# Patient Record
Sex: FEMALE | Race: WHITE | Employment: OTHER | URBAN - METROPOLITAN AREA
[De-identification: names, ages, dates, MRNs, and addresses within clinical notes are randomized per-mention and may not be internally consistent; named-entity substitution may affect disease eponyms.]

---

## 2017-09-12 ENCOUNTER — HOSPITAL ENCOUNTER (OUTPATIENT)
Dept: GENERAL RADIOLOGY | Age: 72
Discharge: HOME | End: 2017-09-12
Attending: INTERNAL MEDICINE | Admitting: INTERNAL MEDICINE

## 2017-10-03 ENCOUNTER — HOSPITAL ENCOUNTER (OUTPATIENT)
Dept: GENERAL RADIOLOGY | Age: 72
Discharge: HOME | End: 2017-10-03
Attending: INTERNAL MEDICINE | Admitting: INTERNAL MEDICINE

## 2017-10-03 LAB
ALBUMIN SERPL-MCNC: 3.8 GM/DL (ref 3.4–5)
ALP BLD-CCNC: 105 IU/L (ref 40–128)
ALT SERPL-CCNC: 16 U/L (ref 10–40)
ANION GAP SERPL CALCULATED.3IONS-SCNC: 14 MMOL/L (ref 4–16)
AST SERPL-CCNC: 18 IU/L (ref 15–37)
BANDED NEUTROPHILS ABSOLUTE COUNT: 0.06 K/CU MM
BANDED NEUTROPHILS RELATIVE PERCENT: 2 % (ref 5–11)
BASOPHILS ABSOLUTE: 0 K/CU MM
BASOPHILS RELATIVE PERCENT: 1 % (ref 0–1)
BILIRUB SERPL-MCNC: 0.2 MG/DL (ref 0–1)
BUN BLDV-MCNC: 16 MG/DL (ref 6–23)
CALCIUM SERPL-MCNC: 9.1 MG/DL (ref 8.3–10.6)
CHLORIDE BLD-SCNC: 102 MMOL/L (ref 99–110)
CO2: 25 MMOL/L (ref 21–32)
CREAT SERPL-MCNC: 0.7 MG/DL (ref 0.6–1.1)
DIFFERENTIAL TYPE: ABNORMAL
GFR AFRICAN AMERICAN: >60 ML/MIN/1.73M2
GFR NON-AFRICAN AMERICAN: >60 ML/MIN/1.73M2
GLUCOSE FASTING: 97 MG/DL (ref 70–99)
HCT VFR BLD CALC: 33.1 % (ref 37–47)
HEMOGLOBIN: 11.2 GM/DL (ref 12.5–16)
LYMPHOCYTES ABSOLUTE: 1.5 K/CU MM
LYMPHOCYTES RELATIVE PERCENT: 53 % (ref 24–44)
MACROCYTES: ABNORMAL
MCH RBC QN AUTO: 34.4 PG (ref 27–31)
MCHC RBC AUTO-ENTMCNC: 33.8 % (ref 32–36)
MCV RBC AUTO: 101.5 FL (ref 78–100)
MONOCYTES ABSOLUTE: 0.3 K/CU MM
MONOCYTES RELATIVE PERCENT: 9 % (ref 0–4)
PDW BLD-RTO: 13.2 % (ref 11.7–14.9)
PLATELET # BLD: 197 K/CU MM (ref 140–440)
PMV BLD AUTO: 8.4 FL (ref 7.5–11.1)
POTASSIUM SERPL-SCNC: 5 MMOL/L (ref 3.5–5.1)
RBC # BLD: 3.26 M/CU MM (ref 4.2–5.4)
SEGMENTED NEUTROPHILS ABSOLUTE COUNT: 1 K/CU MM
SEGMENTED NEUTROPHILS RELATIVE PERCENT: 35 % (ref 36–66)
SODIUM BLD-SCNC: 141 MMOL/L (ref 135–145)
TOTAL PROTEIN: 6 GM/DL (ref 6.4–8.2)
WBC # BLD: 2.9 K/CU MM (ref 4–10.5)
WBC # BLD: ABNORMAL 10*3/UL

## 2017-11-14 ENCOUNTER — HOSPITAL ENCOUNTER (OUTPATIENT)
Dept: GENERAL RADIOLOGY | Age: 72
Discharge: HOME | End: 2017-11-14
Attending: INTERNAL MEDICINE | Admitting: INTERNAL MEDICINE

## 2017-11-14 LAB
ANISOCYTOSIS: ABNORMAL
ATYPICAL LYMPHOCYTE ABSOLUTE COUNT: ABNORMAL
BANDED NEUTROPHILS ABSOLUTE COUNT: 0.07 K/CU MM
BANDED NEUTROPHILS RELATIVE PERCENT: 3 % (ref 5–11)
DIFFERENTIAL TYPE: ABNORMAL
HCT VFR BLD CALC: 25.3 % (ref 37–47)
HEMOGLOBIN: 8.8 GM/DL (ref 12.5–16)
LYMPHOCYTES ABSOLUTE: 1.6 K/CU MM
LYMPHOCYTES RELATIVE PERCENT: 67 % (ref 24–44)
MACROCYTES: ABNORMAL
MCH RBC QN AUTO: 37 PG (ref 27–31)
MCHC RBC AUTO-ENTMCNC: 34.8 % (ref 32–36)
MCV RBC AUTO: 106.3 FL (ref 78–100)
MONOCYTES ABSOLUTE: 0.1 K/CU MM
MONOCYTES RELATIVE PERCENT: 4 % (ref 0–4)
PDW BLD-RTO: 17 % (ref 11.7–14.9)
PLATELET # BLD: 4 K/CU MM (ref 140–440)
PLT MORPHOLOGY: ABNORMAL
RBC # BLD: 2.38 M/CU MM (ref 4.2–5.4)
SEGMENTED NEUTROPHILS ABSOLUTE COUNT: 0.6 K/CU MM
SEGMENTED NEUTROPHILS RELATIVE PERCENT: 26 % (ref 36–66)
WBC # BLD: 2.4 K/CU MM (ref 4–10.5)

## 2018-02-01 ENCOUNTER — HOSPITAL ENCOUNTER (OUTPATIENT)
Dept: OTHER | Age: 73
Discharge: HOME | End: 2018-02-01
Attending: INTERNAL MEDICINE | Admitting: INTERNAL MEDICINE

## 2018-02-23 LAB
ALBUMIN SERPL-MCNC: 2.8 GM/DL (ref 3.4–5)
ALP BLD-CCNC: 80 IU/L (ref 40–129)
ALT SERPL-CCNC: 9 U/L (ref 10–40)
ANION GAP SERPL CALCULATED.3IONS-SCNC: 11 MMOL/L (ref 4–16)
AST SERPL-CCNC: 12 IU/L (ref 15–37)
BILIRUB SERPL-MCNC: 0.2 MG/DL (ref 0–1)
BUN BLDV-MCNC: 13 MG/DL (ref 6–23)
CALCIUM SERPL-MCNC: 8.5 MG/DL (ref 8.3–10.6)
CHLORIDE BLD-SCNC: 107 MMOL/L (ref 99–110)
CO2: 25 MMOL/L (ref 21–32)
CREAT SERPL-MCNC: 0.7 MG/DL (ref 0.6–1.1)
GFR AFRICAN AMERICAN: >60 ML/MIN/1.73M2
GFR NON-AFRICAN AMERICAN: >60 ML/MIN/1.73M2
GLUCOSE BLD-MCNC: 96 MG/DL (ref 70–99)
HCT VFR BLD CALC: 27.6 % (ref 37–47)
HEMOGLOBIN: 8.4 GM/DL (ref 12.5–16)
MCH RBC QN AUTO: 30.1 PG (ref 27–31)
MCHC RBC AUTO-ENTMCNC: 30.4 % (ref 32–36)
MCV RBC AUTO: 98.9 FL (ref 78–100)
PDW BLD-RTO: 17.5 % (ref 11.7–14.9)
PLATELET # BLD: 265 K/CU MM (ref 140–440)
PMV BLD AUTO: 9.5 FL (ref 7.5–11.1)
POTASSIUM SERPL-SCNC: 4.1 MMOL/L (ref 3.5–5.1)
RBC # BLD: 2.79 M/CU MM (ref 4.2–5.4)
SODIUM BLD-SCNC: 143 MMOL/L (ref 135–145)
TOTAL PROTEIN: 4.8 GM/DL (ref 6.4–8.2)
WBC # BLD: 5.4 K/CU MM (ref 4–10.5)

## 2018-03-01 ENCOUNTER — HOSPITAL ENCOUNTER (OUTPATIENT)
Dept: OTHER | Age: 73
Discharge: HOME | End: 2018-03-01
Attending: INTERNAL MEDICINE | Admitting: INTERNAL MEDICINE

## 2018-03-05 LAB
HCT VFR BLD CALC: 36 % (ref 37–47)
HEMOGLOBIN: 10.8 GM/DL (ref 12.5–16)
MCH RBC QN AUTO: 31.2 PG (ref 27–31)
MCHC RBC AUTO-ENTMCNC: 30 % (ref 32–36)
MCV RBC AUTO: 104 FL (ref 78–100)
PDW BLD-RTO: 18 % (ref 11.7–14.9)
PLATELET # BLD: 302 K/CU MM (ref 140–440)
PMV BLD AUTO: 9 FL (ref 7.5–11.1)
RBC # BLD: 3.46 M/CU MM (ref 4.2–5.4)
WBC # BLD: 5.4 K/CU MM (ref 4–10.5)

## 2018-03-14 LAB
BACTERIA: ABNORMAL /HPF
BILIRUBIN URINE: NEGATIVE MG/DL
BLOOD, URINE: ABNORMAL
CLARITY: ABNORMAL
COLOR: YELLOW
GLUCOSE, URINE: NEGATIVE MG/DL
HCT VFR BLD CALC: 29.9 % (ref 37–47)
HEMOGLOBIN: 9.7 GM/DL (ref 12.5–16)
KETONES, URINE: NEGATIVE MG/DL
LEUKOCYTE ESTERASE, URINE: ABNORMAL
MCH RBC QN AUTO: 31.5 PG (ref 27–31)
MCHC RBC AUTO-ENTMCNC: 32.4 % (ref 32–36)
MCV RBC AUTO: 97.1 FL (ref 78–100)
MUCUS: ABNORMAL HPF
NITRITE URINE, QUANTITATIVE: POSITIVE
PDW BLD-RTO: 17.3 % (ref 11.7–14.9)
PH, URINE: 6 (ref 5–8)
PLATELET # BLD: 285 K/CU MM (ref 140–440)
PMV BLD AUTO: 8.6 FL (ref 7.5–11.1)
PROTEIN UA: 100 MG/DL
RBC # BLD: 3.08 M/CU MM (ref 4.2–5.4)
RBC URINE: 12 /HPF (ref 0–6)
SPECIFIC GRAVITY UA: 1.01 (ref 1–1.03)
TRICHOMONAS: ABNORMAL /HPF
UROBILINOGEN, URINE: NORMAL MG/DL (ref 0.2–1)
WBC # BLD: 11 K/CU MM (ref 4–10.5)
WBC CLUMP: ABNORMAL /HPF
WBC UA: 123 /HPF (ref 0–5)

## 2018-03-16 LAB
CULTURE: NORMAL
ORGANISM: NORMAL
REPORT STATUS: NORMAL
REQUEST PROBLEM: NORMAL
SPECIMEN: NORMAL
TOTAL COLONY COUNT: NORMAL

## 2018-10-26 ENCOUNTER — HOSPITAL ENCOUNTER (OUTPATIENT)
Dept: GENERAL RADIOLOGY | Age: 73
Discharge: HOME | End: 2018-10-26
Payer: MEDICARE

## 2018-10-26 ENCOUNTER — HOSPITAL ENCOUNTER (OUTPATIENT)
Age: 73
Discharge: HOME | End: 2018-10-26
Payer: MEDICARE

## 2018-10-26 DIAGNOSIS — J44.0 COPD WITH ACUTE LOWER RESPIRATORY INFECTION (HCC): ICD-10-CM

## 2018-10-26 PROCEDURE — 71046 X-RAY EXAM CHEST 2 VIEWS: CPT

## 2019-05-31 ENCOUNTER — HOSPITAL ENCOUNTER (OUTPATIENT)
Dept: GENERAL RADIOLOGY | Age: 74
Discharge: HOME | End: 2019-05-31
Payer: COMMERCIAL

## 2019-05-31 ENCOUNTER — HOSPITAL ENCOUNTER (OUTPATIENT)
Age: 74
Discharge: HOME | End: 2019-05-31
Payer: COMMERCIAL

## 2019-05-31 DIAGNOSIS — J18.9 ATYPICAL PNEUMONIA: ICD-10-CM

## 2019-05-31 PROCEDURE — 71046 X-RAY EXAM CHEST 2 VIEWS: CPT

## 2020-05-04 ENCOUNTER — HOSPITAL ENCOUNTER (EMERGENCY)
Age: 75
Discharge: HOME | End: 2020-05-04
Payer: MEDICARE

## 2020-05-04 ENCOUNTER — APPOINTMENT (OUTPATIENT)
Dept: GENERAL RADIOLOGY | Age: 75
End: 2020-05-04
Payer: MEDICARE

## 2020-05-04 ENCOUNTER — APPOINTMENT (OUTPATIENT)
Dept: CT IMAGING | Age: 75
End: 2020-05-04
Payer: MEDICARE

## 2020-05-04 VITALS
HEIGHT: 60 IN | DIASTOLIC BLOOD PRESSURE: 50 MMHG | RESPIRATION RATE: 15 BRPM | SYSTOLIC BLOOD PRESSURE: 117 MMHG | TEMPERATURE: 98.4 F | WEIGHT: 137 LBS | HEART RATE: 68 BPM | OXYGEN SATURATION: 97 % | BODY MASS INDEX: 26.9 KG/M2

## 2020-05-04 LAB
ALBUMIN SERPL-MCNC: 4.1 GM/DL (ref 3.4–5)
ALP BLD-CCNC: 97 IU/L (ref 40–129)
ALT SERPL-CCNC: 12 U/L (ref 10–40)
ANION GAP SERPL CALCULATED.3IONS-SCNC: 9 MMOL/L (ref 4–16)
AST SERPL-CCNC: 15 IU/L (ref 15–37)
BASOPHILS ABSOLUTE: 0 K/CU MM
BASOPHILS RELATIVE PERCENT: 0.5 % (ref 0–1)
BILIRUB SERPL-MCNC: 0.2 MG/DL (ref 0–1)
BUN BLDV-MCNC: 14 MG/DL (ref 6–23)
CALCIUM SERPL-MCNC: 9 MG/DL (ref 8.3–10.6)
CHLORIDE BLD-SCNC: 99 MMOL/L (ref 99–110)
CO2: 25 MMOL/L (ref 21–32)
CREAT SERPL-MCNC: 0.7 MG/DL (ref 0.6–1.1)
DIFFERENTIAL TYPE: ABNORMAL
EOSINOPHILS ABSOLUTE: 0.1 K/CU MM
EOSINOPHILS RELATIVE PERCENT: 1.6 % (ref 0–3)
GFR AFRICAN AMERICAN: >60 ML/MIN/1.73M2
GFR NON-AFRICAN AMERICAN: >60 ML/MIN/1.73M2
GLUCOSE BLD-MCNC: 82 MG/DL (ref 70–99)
HCT VFR BLD CALC: 45.7 % (ref 37–47)
HEMOGLOBIN: 15.3 GM/DL (ref 12.5–16)
IMMATURE NEUTROPHIL %: 0.2 % (ref 0–0.43)
LYMPHOCYTES ABSOLUTE: 2.7 K/CU MM
LYMPHOCYTES RELATIVE PERCENT: 43.8 % (ref 24–44)
MCH RBC QN AUTO: 34.9 PG (ref 27–31)
MCHC RBC AUTO-ENTMCNC: 33.5 % (ref 32–36)
MCV RBC AUTO: 104.3 FL (ref 78–100)
MONOCYTES ABSOLUTE: 0.7 K/CU MM
MONOCYTES RELATIVE PERCENT: 11.4 % (ref 0–4)
NUCLEATED RBC %: 0 %
PDW BLD-RTO: 12.6 % (ref 11.7–14.9)
PLATELET # BLD: 312 K/CU MM (ref 140–440)
PMV BLD AUTO: 8.2 FL (ref 7.5–11.1)
POTASSIUM SERPL-SCNC: 4.1 MMOL/L (ref 3.5–5.1)
RBC # BLD: 4.38 M/CU MM (ref 4.2–5.4)
SEGMENTED NEUTROPHILS ABSOLUTE COUNT: 2.7 K/CU MM
SEGMENTED NEUTROPHILS RELATIVE PERCENT: 42.5 % (ref 36–66)
SODIUM BLD-SCNC: 133 MMOL/L (ref 135–145)
TOTAL IMMATURE NEUTOROPHIL: 0.01 K/CU MM
TOTAL NUCLEATED RBC: 0 K/CU MM
TOTAL PROTEIN: 6.5 GM/DL (ref 6.4–8.2)
TOTAL RETICULOCYTE COUNT: 0.09 K/CU MM
TROPONIN T: <0.01 NG/ML
WBC # BLD: 6.2 K/CU MM (ref 4–10.5)

## 2020-05-04 PROCEDURE — 6370000000 HC RX 637 (ALT 250 FOR IP): Performed by: PHYSICIAN ASSISTANT

## 2020-05-04 PROCEDURE — 94667 MNPJ CHEST WALL 1ST: CPT

## 2020-05-04 PROCEDURE — 93005 ELECTROCARDIOGRAM TRACING: CPT | Performed by: PHYSICIAN ASSISTANT

## 2020-05-04 PROCEDURE — 84484 ASSAY OF TROPONIN QUANT: CPT

## 2020-05-04 PROCEDURE — 80053 COMPREHEN METABOLIC PANEL: CPT

## 2020-05-04 PROCEDURE — 71250 CT THORAX DX C-: CPT

## 2020-05-04 PROCEDURE — 99284 EMERGENCY DEPT VISIT MOD MDM: CPT

## 2020-05-04 PROCEDURE — 73501 X-RAY EXAM HIP UNI 1 VIEW: CPT

## 2020-05-04 PROCEDURE — 85025 COMPLETE CBC W/AUTO DIFF WBC: CPT

## 2020-05-04 RX ORDER — OMEPRAZOLE 20 MG/1
20 CAPSULE, DELAYED RELEASE ORAL DAILY
COMMUNITY

## 2020-05-04 RX ORDER — OXYCODONE HYDROCHLORIDE AND ACETAMINOPHEN 5; 325 MG/1; MG/1
1 TABLET ORAL ONCE
Status: COMPLETED | OUTPATIENT
Start: 2020-05-04 | End: 2020-05-04

## 2020-05-04 RX ORDER — BUPROPION HYDROCHLORIDE 150 MG/1
150 TABLET, EXTENDED RELEASE ORAL ONCE
COMMUNITY

## 2020-05-04 RX ORDER — LANOLIN ALCOHOL/MO/W.PET/CERES
3 CREAM (GRAM) TOPICAL NIGHTLY PRN
COMMUNITY

## 2020-05-04 RX ORDER — ESCITALOPRAM OXALATE 5 MG/1
5 TABLET ORAL NIGHTLY
COMMUNITY

## 2020-05-04 RX ORDER — GABAPENTIN 400 MG/1
800 CAPSULE ORAL 3 TIMES DAILY
COMMUNITY

## 2020-05-04 RX ADMIN — OXYCODONE HYDROCHLORIDE AND ACETAMINOPHEN 1 TABLET: 5; 325 TABLET ORAL at 18:28

## 2020-05-04 SDOH — HEALTH STABILITY: MENTAL HEALTH: HOW OFTEN DO YOU HAVE A DRINK CONTAINING ALCOHOL?: NEVER

## 2020-05-04 ASSESSMENT — PAIN DESCRIPTION - FREQUENCY: FREQUENCY: INTERMITTENT

## 2020-05-04 ASSESSMENT — PAIN DESCRIPTION - ORIENTATION: ORIENTATION: LEFT

## 2020-05-04 ASSESSMENT — PAIN DESCRIPTION - ONSET: ONSET: ON-GOING

## 2020-05-04 ASSESSMENT — PAIN SCALES - GENERAL
PAINLEVEL_OUTOF10: 6
PAINLEVEL_OUTOF10: 4
PAINLEVEL_OUTOF10: 2

## 2020-05-04 ASSESSMENT — PAIN DESCRIPTION - PROGRESSION: CLINICAL_PROGRESSION: GRADUALLY WORSENING

## 2020-05-04 ASSESSMENT — PAIN DESCRIPTION - DESCRIPTORS: DESCRIPTORS: ACHING

## 2020-05-04 ASSESSMENT — PAIN DESCRIPTION - PAIN TYPE: TYPE: ACUTE PAIN

## 2020-05-04 NOTE — ED PROVIDER NOTES
Peter      PCP: Janine Greenwood MD    [facility]    Chief Complaint   Patient presents with    Rib Pain       This patient was not evaluated by the attending physician. I have independently evaluated this patient. HPI    Afshin Melton is a 75 y.o. female who presents by EMS for left rib and hip pain. Onset 2 days ago. Patient states she slipped in the bathtub and injured left rib cage and left hip. Patient denies head injury or loss of consciousness. Patient denies any neck pain. Patient states he has chronic low back pain with no new change. Patient denies any lower extremity numbness, states left leg feels weak due to pain when moving it. Patient denies bowel incontinence, saddle anesthesia. Left rib pain worsens with deep inspiration. Patient also states she is had left chest pain which radiates in the back for the past month to month and a half. Patient describes this as achiness. Patient states she has had recent cardiac work-up. Patient denies any shortness of breath. No known aggravating or alleviating factors to chest pain. Patient denies any upper extremity injury. Patient denies abdominal pain. REVIEW OF SYSTEMS    Cardiac: see HPI No Syncope  Respiratory: +Pain on Inspiration over affected chestwall region, No SOB, wheezes, Hemoptysis  GI: No Vomiting, No Abdominal Pain  :  No hematuria  Musculoskeletal:  See HPI  Neurologic: No Head Injury. No Neck Pain. No LOC. No lightheadedness, dizziness.     All other review of systems are negative  See HPI and nursing notes for additional information     PAST MEDICAL & SURGICAL HISTORY    Past Medical History:   Diagnosis Date    Anxiety     Back pain     Cancer ([facility] [address]     Cardiac abnormality     Falls      Past Surgical History:   Procedure Laterality Date    BLADDER REMOVAL      FOOT SURGERY      HYSTERECTOMY      SINUS SURGERY         CURRENT MEDICATIONS    Current Outpatient Rx   Medication Sig Dispense Refill    gabapentin (NEURONTIN) 400 MG capsule Take 800 mg by mouth 3 times daily.       escitalopram (LEXAPRO) 5 MG tablet Take 5 mg by mouth nightly      omeprazole (PRILOSEC) 20 MG delayed release capsule Take 20 mg by mouth daily      buPROPion (WELLBUTRIN SR) 150 MG extended release tablet Take 150 mg by mouth once      Carboxymethylcellulose Sodium (REFRESH TEARS OP) Apply to eye      aspirin 81 MG tablet Take 81 mg by mouth daily      Multiple Vitamins-Minerals (PRESERVISION AREDS PO) Take by mouth BID      NONFORMULARY Calcium, Magnesium, Zinc tablet      melatonin 3 MG TABS tablet Take 3 mg by mouth nightly as needed         ALLERGIES    Allergies   Allergen Reactions    Contrast [Barium-Containing Compounds] Hives     HIVES       SOCIAL & FAMILY HISTORY    Social History     Socioeconomic History    Marital status:      Spouse name: None    Number of children: None    Years of education: None    Highest education level: None   Occupational History    None   Social Needs    Financial resource strain: None    Food insecurity     Worry: None     Inability: None    Transportation needs     Medical: None     Non-medical: None   Tobacco Use    Smoking status: Current Every Day Smoker     Packs/day: 1.00    Smokeless tobacco: Never Used   Substance and Sexual Activity    Alcohol use: Never     Frequency: Never    Drug use: None    Sexual activity: None   Lifestyle    Physical activity     Days per week: None     Minutes per session: None    Stress: None   Relationships    Social connections     Talks on phone: None     Gets together: None     Attends Jew service: None     Active member of club or organization: None     Attends meetings of clubs or organizations: None     Relationship status: None    Intimate partner violence     Fear of current or ex partner: None     Emotionally abused: None     Physically abused: None     Forced sexual activity: None   Other

## 2020-05-06 ENCOUNTER — TELEPHONE (OUTPATIENT)
Dept: SURGERY | Age: 75
End: 2020-05-06

## 2020-05-21 ENCOUNTER — APPOINTMENT (OUTPATIENT)
Dept: CT IMAGING | Age: 75
DRG: 193 | End: 2020-05-21
Payer: MEDICARE

## 2020-05-21 ENCOUNTER — APPOINTMENT (OUTPATIENT)
Dept: GENERAL RADIOLOGY | Age: 75
DRG: 193 | End: 2020-05-21
Payer: MEDICARE

## 2020-05-21 ENCOUNTER — HOSPITAL ENCOUNTER (INPATIENT)
Age: 75
LOS: 5 days | Discharge: HOME | DRG: 193 | End: 2020-05-26
Attending: INTERNAL MEDICINE | Admitting: INTERNAL MEDICINE
Payer: MEDICARE

## 2020-05-21 PROBLEM — J18.9 PNEUMONIA: Status: ACTIVE | Noted: 2020-05-21

## 2020-05-21 LAB
ALBUMIN SERPL-MCNC: 3.5 GM/DL (ref 3.4–5)
ALP BLD-CCNC: 101 IU/L (ref 40–129)
ALT SERPL-CCNC: 8 U/L (ref 10–40)
AMORPHOUS: ABNORMAL /HPF
ANION GAP SERPL CALCULATED.3IONS-SCNC: 12 MMOL/L (ref 4–16)
APTT: 34.2 SECONDS (ref 25.1–37.1)
AST SERPL-CCNC: 14 IU/L (ref 15–37)
BACTERIA: NEGATIVE /HPF
BASE EXCESS: 4 (ref 0–2.4)
BASOPHILS ABSOLUTE: 0 K/CU MM
BASOPHILS RELATIVE PERCENT: 0.2 % (ref 0–1)
BILIRUB SERPL-MCNC: 0.4 MG/DL (ref 0–1)
BILIRUBIN URINE: NEGATIVE MG/DL
BLOOD, URINE: ABNORMAL
BUN BLDV-MCNC: 25 MG/DL (ref 6–23)
CALCIUM SERPL-MCNC: 8.3 MG/DL (ref 8.3–10.6)
CHLORIDE BLD-SCNC: 100 MMOL/L (ref 99–110)
CLARITY: ABNORMAL
CO2: 18 MMOL/L (ref 21–32)
COLOR: YELLOW
COMMENT: ABNORMAL
CREAT SERPL-MCNC: 1.1 MG/DL (ref 0.6–1.1)
D DIMER: 1179 NG/ML(DDU)
DIFFERENTIAL TYPE: ABNORMAL
EKG ATRIAL RATE: 75 BPM
EKG DIAGNOSIS: NORMAL
EKG P AXIS: 20 DEGREES
EKG P-R INTERVAL: 114 MS
EKG Q-T INTERVAL: 412 MS
EKG QRS DURATION: 128 MS
EKG QTC CALCULATION (BAZETT): 460 MS
EKG R AXIS: -5 DEGREES
EKG T AXIS: 7 DEGREES
EKG VENTRICULAR RATE: 75 BPM
EOSINOPHILS ABSOLUTE: 0 K/CU MM
EOSINOPHILS RELATIVE PERCENT: 0 % (ref 0–3)
FIBRINOGEN LEVEL: 813 MG/DL (ref 196.9–442.1)
GFR AFRICAN AMERICAN: 59 ML/MIN/1.73M2
GFR NON-AFRICAN AMERICAN: 48 ML/MIN/1.73M2
GLUCOSE BLD-MCNC: 111 MG/DL (ref 70–99)
GLUCOSE, URINE: NEGATIVE MG/DL
HCO3 VENOUS: 20 MMOL/L (ref 19–25)
HCT VFR BLD CALC: 39.6 % (ref 37–47)
HEMOGLOBIN: 13.3 GM/DL (ref 12.5–16)
IMMATURE NEUTROPHIL %: 0.4 % (ref 0–0.43)
INR BLD: 1.04 INDEX
KETONES, URINE: ABNORMAL MG/DL
LACTATE: 0.6 MMOL/L (ref 0.4–2)
LEUKOCYTE ESTERASE, URINE: ABNORMAL
LYMPHOCYTES ABSOLUTE: 0.7 K/CU MM
LYMPHOCYTES RELATIVE PERCENT: 5.3 % (ref 24–44)
MCH RBC QN AUTO: 35.5 PG (ref 27–31)
MCHC RBC AUTO-ENTMCNC: 33.6 % (ref 32–36)
MCV RBC AUTO: 105.6 FL (ref 78–100)
MONOCYTES ABSOLUTE: 0.7 K/CU MM
MONOCYTES RELATIVE PERCENT: 5.3 % (ref 0–4)
NITRITE URINE, QUANTITATIVE: NEGATIVE
NUCLEATED RBC %: 0 %
O2 SAT, VEN: 89.1 % (ref 50–70)
PCO2, VEN: 33 MMHG (ref 38–52)
PDW BLD-RTO: 12.9 % (ref 11.7–14.9)
PH VENOUS: 7.39 (ref 7.32–7.42)
PH, URINE: 7 (ref 5–8)
PLATELET # BLD: 256 K/CU MM (ref 140–440)
PMV BLD AUTO: 8.3 FL (ref 7.5–11.1)
PO2, VEN: 156 MMHG (ref 28–48)
POTASSIUM SERPL-SCNC: 4.5 MMOL/L (ref 3.5–5.1)
PRO-BNP: 1195 PG/ML
PROTEIN UA: >500 MG/DL
PROTHROMBIN TIME: 12.6 SECONDS (ref 11.7–14.5)
RBC # BLD: 3.75 M/CU MM (ref 4.2–5.4)
RBC URINE: 17 /HPF (ref 0–6)
SEGMENTED NEUTROPHILS ABSOLUTE COUNT: 11.2 K/CU MM
SEGMENTED NEUTROPHILS RELATIVE PERCENT: 88.8 % (ref 36–66)
SODIUM BLD-SCNC: 130 MMOL/L (ref 135–145)
SPECIFIC GRAVITY UA: 1.01 (ref 1–1.03)
TOTAL IMMATURE NEUTOROPHIL: 0.05 K/CU MM
TOTAL NUCLEATED RBC: 0 K/CU MM
TOTAL PROTEIN: 5.6 GM/DL (ref 6.4–8.2)
TRICHOMONAS: ABNORMAL /HPF
TROPONIN T: <0.01 NG/ML
UROBILINOGEN, URINE: 1 MG/DL (ref 0.2–1)
WBC # BLD: 12.6 K/CU MM (ref 4–10.5)
WBC UA: ABNORMAL /HPF (ref 0–5)

## 2020-05-21 PROCEDURE — 6370000000 HC RX 637 (ALT 250 FOR IP): Performed by: INTERNAL MEDICINE

## 2020-05-21 PROCEDURE — 96365 THER/PROPH/DIAG IV INF INIT: CPT

## 2020-05-21 PROCEDURE — 99285 EMERGENCY DEPT VISIT HI MDM: CPT

## 2020-05-21 PROCEDURE — 2580000003 HC RX 258: Performed by: PHYSICIAN ASSISTANT

## 2020-05-21 PROCEDURE — 83880 ASSAY OF NATRIURETIC PEPTIDE: CPT

## 2020-05-21 PROCEDURE — 82805 BLOOD GASES W/O2 SATURATION: CPT

## 2020-05-21 PROCEDURE — 84484 ASSAY OF TROPONIN QUANT: CPT

## 2020-05-21 PROCEDURE — 85730 THROMBOPLASTIN TIME PARTIAL: CPT

## 2020-05-21 PROCEDURE — 87186 SC STD MICRODIL/AGAR DIL: CPT

## 2020-05-21 PROCEDURE — 2580000003 HC RX 258: Performed by: INTERNAL MEDICINE

## 2020-05-21 PROCEDURE — 87086 URINE CULTURE/COLONY COUNT: CPT

## 2020-05-21 PROCEDURE — 93005 ELECTROCARDIOGRAM TRACING: CPT | Performed by: PHYSICIAN ASSISTANT

## 2020-05-21 PROCEDURE — 86141 C-REACTIVE PROTEIN HS: CPT

## 2020-05-21 PROCEDURE — 94640 AIRWAY INHALATION TREATMENT: CPT

## 2020-05-21 PROCEDURE — 81001 URINALYSIS AUTO W/SCOPE: CPT

## 2020-05-21 PROCEDURE — 1200000000 HC SEMI PRIVATE

## 2020-05-21 PROCEDURE — 85025 COMPLETE CBC W/AUTO DIFF WBC: CPT

## 2020-05-21 PROCEDURE — 87633 RESP VIRUS 12-25 TARGETS: CPT

## 2020-05-21 PROCEDURE — 36415 COLL VENOUS BLD VENIPUNCTURE: CPT

## 2020-05-21 PROCEDURE — 87077 CULTURE AEROBIC IDENTIFY: CPT

## 2020-05-21 PROCEDURE — 87798 DETECT AGENT NOS DNA AMP: CPT

## 2020-05-21 PROCEDURE — 84145 PROCALCITONIN (PCT): CPT

## 2020-05-21 PROCEDURE — 2060000000 HC ICU INTERMEDIATE R&B

## 2020-05-21 PROCEDURE — 6370000000 HC RX 637 (ALT 250 FOR IP): Performed by: PHYSICIAN ASSISTANT

## 2020-05-21 PROCEDURE — 83605 ASSAY OF LACTIC ACID: CPT

## 2020-05-21 PROCEDURE — U0002 COVID-19 LAB TEST NON-CDC: HCPCS

## 2020-05-21 PROCEDURE — 85610 PROTHROMBIN TIME: CPT

## 2020-05-21 PROCEDURE — 6360000002 HC RX W HCPCS: Performed by: INTERNAL MEDICINE

## 2020-05-21 PROCEDURE — 87486 CHLMYD PNEUM DNA AMP PROBE: CPT

## 2020-05-21 PROCEDURE — 87581 M.PNEUMON DNA AMP PROBE: CPT

## 2020-05-21 PROCEDURE — 71045 X-RAY EXAM CHEST 1 VIEW: CPT

## 2020-05-21 PROCEDURE — 71250 CT THORAX DX C-: CPT

## 2020-05-21 PROCEDURE — 87040 BLOOD CULTURE FOR BACTERIA: CPT

## 2020-05-21 PROCEDURE — 85384 FIBRINOGEN ACTIVITY: CPT

## 2020-05-21 PROCEDURE — 82728 ASSAY OF FERRITIN: CPT

## 2020-05-21 PROCEDURE — 83615 LACTATE (LD) (LDH) ENZYME: CPT

## 2020-05-21 PROCEDURE — 85379 FIBRIN DEGRADATION QUANT: CPT

## 2020-05-21 PROCEDURE — 6360000002 HC RX W HCPCS: Performed by: PHYSICIAN ASSISTANT

## 2020-05-21 PROCEDURE — 93010 ELECTROCARDIOGRAM REPORT: CPT | Performed by: INTERNAL MEDICINE

## 2020-05-21 PROCEDURE — 80053 COMPREHEN METABOLIC PANEL: CPT

## 2020-05-21 RX ORDER — ASPIRIN 81 MG/1
81 TABLET, CHEWABLE ORAL DAILY
Status: DISCONTINUED | OUTPATIENT
Start: 2020-05-21 | End: 2020-05-26 | Stop reason: HOSPADM

## 2020-05-21 RX ORDER — BUPROPION HYDROCHLORIDE 150 MG/1
150 TABLET, EXTENDED RELEASE ORAL ONCE
Status: COMPLETED | OUTPATIENT
Start: 2020-05-21 | End: 2020-05-21

## 2020-05-21 RX ORDER — ACETAMINOPHEN 325 MG/1
650 TABLET ORAL EVERY 6 HOURS PRN
Status: DISCONTINUED | OUTPATIENT
Start: 2020-05-21 | End: 2020-05-26 | Stop reason: HOSPADM

## 2020-05-21 RX ORDER — HYDROXYCHLOROQUINE SULFATE 200 MG/1
400 TABLET, FILM COATED ORAL EVERY 12 HOURS
Status: COMPLETED | OUTPATIENT
Start: 2020-05-21 | End: 2020-05-22

## 2020-05-21 RX ORDER — SODIUM CHLORIDE 0.9 % (FLUSH) 0.9 %
10 SYRINGE (ML) INJECTION EVERY 12 HOURS SCHEDULED
Status: DISCONTINUED | OUTPATIENT
Start: 2020-05-21 | End: 2020-05-26 | Stop reason: HOSPADM

## 2020-05-21 RX ORDER — ACETAMINOPHEN 325 MG/1
650 TABLET ORAL EVERY 6 HOURS PRN
Status: DISCONTINUED | OUTPATIENT
Start: 2020-05-21 | End: 2020-05-23

## 2020-05-21 RX ORDER — PROMETHAZINE HYDROCHLORIDE 25 MG/1
12.5 TABLET ORAL EVERY 6 HOURS PRN
Status: DISCONTINUED | OUTPATIENT
Start: 2020-05-21 | End: 2020-05-26 | Stop reason: HOSPADM

## 2020-05-21 RX ORDER — OXYCODONE HYDROCHLORIDE 5 MG/1
5 TABLET ORAL EVERY 6 HOURS PRN
COMMUNITY

## 2020-05-21 RX ORDER — SODIUM CHLORIDE 9 MG/ML
INJECTION, SOLUTION INTRAVENOUS CONTINUOUS
Status: DISCONTINUED | OUTPATIENT
Start: 2020-05-21 | End: 2020-05-23

## 2020-05-21 RX ORDER — ONDANSETRON 2 MG/ML
4 INJECTION INTRAMUSCULAR; INTRAVENOUS EVERY 6 HOURS PRN
Status: DISCONTINUED | OUTPATIENT
Start: 2020-05-21 | End: 2020-05-26 | Stop reason: HOSPADM

## 2020-05-21 RX ORDER — IBUPROFEN 800 MG/1
800 TABLET ORAL EVERY 8 HOURS PRN
COMMUNITY

## 2020-05-21 RX ORDER — GABAPENTIN 400 MG/1
800 CAPSULE ORAL 3 TIMES DAILY
Status: DISCONTINUED | OUTPATIENT
Start: 2020-05-21 | End: 2020-05-26 | Stop reason: HOSPADM

## 2020-05-21 RX ORDER — ACETAMINOPHEN 650 MG/1
650 SUPPOSITORY RECTAL EVERY 6 HOURS PRN
Status: DISCONTINUED | OUTPATIENT
Start: 2020-05-21 | End: 2020-05-23

## 2020-05-21 RX ORDER — CARBOXYMETHYLCELLULOSE SODIUM 10 MG/ML
1 GEL OPHTHALMIC 3 TIMES DAILY
Status: DISCONTINUED | OUTPATIENT
Start: 2020-05-21 | End: 2020-05-26 | Stop reason: HOSPADM

## 2020-05-21 RX ORDER — 0.9 % SODIUM CHLORIDE 0.9 %
500 INTRAVENOUS SOLUTION INTRAVENOUS ONCE
Status: COMPLETED | OUTPATIENT
Start: 2020-05-21 | End: 2020-05-21

## 2020-05-21 RX ORDER — SODIUM CHLORIDE 0.9 % (FLUSH) 0.9 %
10 SYRINGE (ML) INJECTION PRN
Status: DISCONTINUED | OUTPATIENT
Start: 2020-05-21 | End: 2020-05-26 | Stop reason: HOSPADM

## 2020-05-21 RX ORDER — LANOLIN ALCOHOL/MO/W.PET/CERES
6 CREAM (GRAM) TOPICAL NIGHTLY PRN
Status: DISCONTINUED | OUTPATIENT
Start: 2020-05-21 | End: 2020-05-26 | Stop reason: HOSPADM

## 2020-05-21 RX ORDER — ESCITALOPRAM OXALATE 10 MG/1
5 TABLET ORAL NIGHTLY
Status: DISCONTINUED | OUTPATIENT
Start: 2020-05-21 | End: 2020-05-26 | Stop reason: HOSPADM

## 2020-05-21 RX ORDER — HYDROXYCHLOROQUINE SULFATE 200 MG/1
200 TABLET, FILM COATED ORAL EVERY 12 HOURS
Status: DISCONTINUED | OUTPATIENT
Start: 2020-05-22 | End: 2020-05-23

## 2020-05-21 RX ORDER — ALBUTEROL SULFATE 90 UG/1
2 AEROSOL, METERED RESPIRATORY (INHALATION) ONCE
Status: COMPLETED | OUTPATIENT
Start: 2020-05-21 | End: 2020-05-21

## 2020-05-21 RX ORDER — POLYETHYLENE GLYCOL 3350 17 G/17G
17 POWDER, FOR SOLUTION ORAL DAILY PRN
Status: DISCONTINUED | OUTPATIENT
Start: 2020-05-21 | End: 2020-05-26 | Stop reason: HOSPADM

## 2020-05-21 RX ORDER — ACETAMINOPHEN 500 MG
1000 TABLET ORAL ONCE
Status: COMPLETED | OUTPATIENT
Start: 2020-05-21 | End: 2020-05-21

## 2020-05-21 RX ORDER — ACETAMINOPHEN 650 MG/1
650 SUPPOSITORY RECTAL EVERY 6 HOURS PRN
Status: DISCONTINUED | OUTPATIENT
Start: 2020-05-21 | End: 2020-05-26 | Stop reason: HOSPADM

## 2020-05-21 RX ORDER — OXYCODONE HYDROCHLORIDE 5 MG/1
5 TABLET ORAL EVERY 6 HOURS PRN
Status: DISCONTINUED | OUTPATIENT
Start: 2020-05-21 | End: 2020-05-26 | Stop reason: HOSPADM

## 2020-05-21 RX ADMIN — SODIUM CHLORIDE, PRESERVATIVE FREE 10 ML: 5 INJECTION INTRAVENOUS at 21:40

## 2020-05-21 RX ADMIN — SODIUM CHLORIDE: 9 INJECTION, SOLUTION INTRAVENOUS at 23:26

## 2020-05-21 RX ADMIN — ASPIRIN 81 MG 81 MG: 81 TABLET ORAL at 21:38

## 2020-05-21 RX ADMIN — SODIUM CHLORIDE 500 ML: 9 INJECTION, SOLUTION INTRAVENOUS at 15:24

## 2020-05-21 RX ADMIN — ALBUTEROL SULFATE 2 PUFF: 90 AEROSOL, METERED RESPIRATORY (INHALATION) at 15:07

## 2020-05-21 RX ADMIN — GABAPENTIN 800 MG: 400 CAPSULE ORAL at 21:38

## 2020-05-21 RX ADMIN — ESCITALOPRAM OXALATE 5 MG: 10 TABLET ORAL at 21:38

## 2020-05-21 RX ADMIN — ACETAMINOPHEN 1000 MG: 500 TABLET, FILM COATED ORAL at 14:58

## 2020-05-21 RX ADMIN — ENOXAPARIN SODIUM 30 MG: 30 INJECTION SUBCUTANEOUS at 21:39

## 2020-05-21 RX ADMIN — MELATONIN 6 MG: at 21:38

## 2020-05-21 RX ADMIN — CEFTRIAXONE SODIUM 1 G: 1 INJECTION, POWDER, FOR SOLUTION INTRAMUSCULAR; INTRAVENOUS at 19:04

## 2020-05-21 RX ADMIN — HYDROXYCHLOROQUINE SULFATE 400 MG: 200 TABLET, FILM COATED ORAL at 21:40

## 2020-05-21 RX ADMIN — OXYCODONE HYDROCHLORIDE 5 MG: 5 TABLET ORAL at 21:38

## 2020-05-21 RX ADMIN — BUPROPION HYDROCHLORIDE 150 MG: 150 TABLET, EXTENDED RELEASE ORAL at 21:38

## 2020-05-21 ASSESSMENT — PAIN DESCRIPTION - DESCRIPTORS
DESCRIPTORS: ACHING
DESCRIPTORS: ACHING

## 2020-05-21 ASSESSMENT — PAIN SCALES - GENERAL
PAINLEVEL_OUTOF10: 4
PAINLEVEL_OUTOF10: 3
PAINLEVEL_OUTOF10: 3
PAINLEVEL_OUTOF10: 4

## 2020-05-21 ASSESSMENT — PAIN DESCRIPTION - FREQUENCY: FREQUENCY: INTERMITTENT

## 2020-05-21 ASSESSMENT — PAIN DESCRIPTION - ORIENTATION
ORIENTATION: LEFT
ORIENTATION: LEFT

## 2020-05-21 ASSESSMENT — PAIN DESCRIPTION - PAIN TYPE
TYPE: ACUTE PAIN
TYPE: ACUTE PAIN

## 2020-05-21 ASSESSMENT — PAIN DESCRIPTION - ONSET: ONSET: ON-GOING

## 2020-05-21 ASSESSMENT — PAIN DESCRIPTION - PROGRESSION
CLINICAL_PROGRESSION: GRADUALLY WORSENING
CLINICAL_PROGRESSION: NOT CHANGED

## 2020-05-21 ASSESSMENT — PAIN DESCRIPTION - LOCATION
LOCATION: RIB CAGE
LOCATION: ABDOMEN

## 2020-05-21 NOTE — ED PROVIDER NOTES
EMERGENCY DEPARTMENT ENCOUNTER      PCP: Brady Dickerson MD    CHIEF COMPLAINT    Chief Complaint   Patient presents with    Shortness of Breath    Fever     HPI    Jairo Pat is a 75 y.o. female who presents to the emergency department today with shortness of breath, fever, mild cough. Patient states developed in the last 2 or 3 days. She states that she was seen here in the emergency department 2 weeks ago after sustaining a fall, at that point she had 1/7 rib fracture, was doing well up until the last several days when she had noticed the shortness of breath and the cough. She also admits to fevers as high as 102 °F.  Has had just generalized malaise, decreased energy. Has felt fatigued. Denies any significant active chest pa palpitations. No syncopal episodes. Denies abdominal pain, denies nausea vomiting diarrhea, change in bowel habits. REVIEW OF SYSTEMS    Constitutional: See HPi  HENT:  Denies sore throat or ear pain   Cardiovascular:  No chest pain. No palpitations, No syncope  Respiratory:  See HPI. GI:  Denies abdominal pain, nausea, vomiting, or diarrhea  :  Denies any urinary symptoms or vaginal symptoms.    Musculoskeletal:  Denies back pain,   Skin:  Denies rash  Neurologic:  Denies headache, focal weakness or sensory changes   Endocrine:  Denies polyuria or polydypsia   Lymphatic:  Denies swollen glands     All other review of systems are negative  See HPI and nursing notes for additional information       PAST MEDICAL & SURGICAL HISTORY    Past Medical History:   Diagnosis Date    Anxiety     Back pain     Cancer [facility] [address]     bladder    Cardiac abnormality     Falls      Past Surgical History:   Procedure Laterality Date    BLADDER REMOVAL      FOOT SURGERY      HYSTERECTOMY      INNER EAR SURGERY Right 1983    SINUS SURGERY         CURRENT MEDICATIONS    Current Outpatient Rx   Medication Sig Dispense Refill    oxyCODONE (ROXICODONE) 5 MG immediate release tablet Take 5 mg by mouth every 6 hours as needed for Pain.  ibuprofen (ADVIL;MOTRIN) 800 MG tablet Take 800 mg by mouth every 8 hours as needed for Pain      gabapentin (NEURONTIN) 400 MG capsule Take 800 mg by mouth 3 times daily.       escitalopram (LEXAPRO) 5 MG tablet Take 5 mg by mouth nightly      omeprazole (PRILOSEC) 20 MG delayed release capsule Take 20 mg by mouth daily      buPROPion (WELLBUTRIN SR) 150 MG extended release tablet Take 150 mg by mouth once      Carboxymethylcellulose Sodium (REFRESH TEARS OP) Apply to eye      aspirin 81 MG tablet Take 81 mg by mouth daily      Multiple Vitamins-Minerals (PRESERVISION AREDS PO) Take by mouth BID      NONFORMULARY Calcium, Magnesium, Zinc tablet      melatonin 3 MG TABS tablet Take 3 mg by mouth nightly as needed         ALLERGIES    Allergies   Allergen Reactions    Contrast [Barium-Containing Compounds] Hives     HIVES       SOCIAL & FAMILY HISTORY    Social History     Socioeconomic History    Marital status:      Spouse name: None    Number of children: None    Years of education: None    Highest education level: None   Occupational History    None   Social Needs    Financial resource strain: None    Food insecurity     Worry: None     Inability: None    Transportation needs     Medical: None     Non-medical: None   Tobacco Use    Smoking status: Current Every Day Smoker     Packs/day: 1.00     Types: Cigarettes    Smokeless tobacco: Never Used   Substance and Sexual Activity    Alcohol use: Never     Frequency: Never    Drug use: Never    Sexual activity: None   Lifestyle    Physical activity     Days per week: None     Minutes per session: None    Stress: None   Relationships    Social connections     Talks on phone: None     Gets together: None     Attends Confucianism service: None     Active member of club or organization: None     Attends meetings of clubs or organizations: None     Relationship status: None    Intimate NEGATIVE    RBC, UA 17 (H) 0 - 6 /HPF    WBC, UA NONE SEEN 0 - 5 /HPF    Bacteria, UA NEGATIVE NEGATIVE /HPF    Trichomonas, UA NONE SEEN NONE SEEN /HPF    Amorphous, UA OCCASIONAL /HPF   EKG 12 Lead   Result Value Ref Range    Ventricular Rate 75 BPM    Atrial Rate 75 BPM    P-R Interval 114 ms    QRS Duration 128 ms    Q-T Interval 412 ms    QTc Calculation (Bazett) 460 ms    P Axis 20 degrees    R Axis -5 degrees    T Axis 7 degrees    Diagnosis       Normal sinus rhythm  Right bundle branch block  Abnormal ECG  No previous ECGs available  Confirmed by [facility]ANOS ([id number]) on 5/21/2020 4:49:06 PM         EKG  EKG 12 Lead   Result Value Ref Range    Ventricular Rate 75 BPM    Atrial Rate 75 BPM    P-R Interval 114 ms    QRS Duration 128 ms    Q-T Interval 412 ms    QTc Calculation (Bazett) 460 ms    P Axis 20 degrees    R Axis -5 degrees    T Axis 7 degrees    Diagnosis       Normal sinus rhythm  Right bundle branch block  Abnormal ECG  No previous ECGs available  Confirmed by [facility]AN[facility] ([id number]) on 5/21/2020 4:49:06 PM       Imaging  Ct Chest Wo Contrast    Result Date: 5/21/2020  EXAMINATION: CT OF THE CHEST WITHOUT CONTRAST 5/21/2020 3:31 pm TECHNIQUE: CT of the chest was performed without the administration of intravenous contrast. Multiplanar reformatted images are provided for review. Dose modulation, iterative reconstruction, and/or weight based adjustment of the mA/kV was utilized to reduce the radiation dose to as low as reasonably achievable. COMPARISON: CT chest 05/04/2020. Chest radiograph 05/21/2020.  HISTORY: ORDERING SYSTEM PROVIDED HISTORY: Recent rib fracture, pleural effusion on chest x-ray, fever, cough TECHNOLOGIST PROVIDED HISTORY: Reason for exam:->Recent rib fracture, pleural effusion on chest x-ray, fever, cough Reason for Exam: Recent rib fracture, pleural effusion on chest x-ray, fever, cough Acuity: Acute Type of Exam: Initial Mechanism of Injury: fell 2 weeks ago, pain Relevant Medical/Surgical History: none FINDINGS: Mediastinum: Lack of intravenous contrast limits evaluation of the mediastinum. The thoracic aorta is normal in caliber with moderate calcific plaquing. Coronary artery atherosclerotic vascular calcifications are seen. The main pulmonary artery is normal in caliber. The heart is normal in size. No pericardial effusion. The mediastinal esophagus and thyroid gland are unremarkable. No lymphadenopathy. Lungs/pleura: Mild secretions in the trachea and left mainstem bronchus. The central airways are otherwise patent. Trace right and small left pleural effusions. No pneumothorax. Mild emphysematous changes. Mild bilateral dependent atelectasis. No interlobular septal thickening. 2 mm nodule in the left upper lobe on image 49 series 3 unchanged. 4 mm nodule in the right lower lobe on image 72 unchanged. The previously identified 3 mm nodule in the right lower lobe is not clearly seen. Upper Abdomen: Limited images through the upper abdomen demonstrate no acute process. Soft Tissues/Bones: Subacute mildly displaced left lateral 7th rib fracture. 1. Subacute mildly displaced left lateral 7th rib fracture. 2. Trace right and small left pleural effusions. No pneumothorax. 3. Mild bibasilar atelectasis. In the appropriate clinical setting superimposed pneumonia is not excluded. 4. Small pulmonary nodules measuring up to 4 mm unchanged from the previous study. See recommendations below. 5. Mild emphysematous changes. RECOMMENDATIONS: Fleischner Society guidelines for follow-up and management of incidentally detected pulmonary nodules: Multiple Solid Nodules: Nodule size less than 6 mm In a high-risk patient, optional CT at 12 months. - Low risk patients include individuals with minimal or absent history of smoking and other known risk factors. - High risk patients include individuals with a history or smoking or known risk factors.  Radiology 2017 http://pubs. rsna.org/doi/full/10.1148/radiol. 0550943423     Ct Chest Wo Contrast    Result Date: 5/4/2020  EXAMINATION: CT OF THE CHEST WITHOUT CONTRAST 5/4/2020 6:30 pm TECHNIQUE: CT of the chest was performed without the administration of intravenous contrast. Multiplanar reformatted images are provided for review. Dose modulation, iterative reconstruction, and/or weight based adjustment of the mA/kV was utilized to reduce the radiation dose to as low as reasonably achievable. COMPARISON: Chest radiograph 05/31/2019. HISTORY: ORDERING SYSTEM PROVIDED HISTORY: left rib injury chest pain TECHNOLOGIST PROVIDED HISTORY: Reason for exam:->left rib injury chest pain Reason for Exam: trauma/ fall in bathtub, left lateral rib pain Acuity: Acute Type of Exam: Initial Relevant Medical/Surgical History: no hx FINDINGS: Mediastinum: Lack of intravenous contrast limits evaluation of the mediastinum. The thoracic aorta is normal in caliber with moderate calcific plaquing. Coronary artery atherosclerotic vascular calcifications are seen. The main pulmonary artery is normal in caliber. The heart is normal in size. No pericardial effusion. The mediastinal esophagus and thyroid gland are unremarkable. No lymphadenopathy. Lungs/pleura: Mild secretions in the trachea. The central airways are otherwise patent. Trace left pleural effusion. No right pleural effusion. No pneumothorax. No consolidation or interlobular septal thickening. Mild emphysematous changes. Mild bilateral dependent atelectasis. 3 mm nodule in the right lower lobe on image 83 series 3.  4 mm nodule in the right lower lobe on image 86.  2 mm nodule in the left upper lobe on image 61. Upper Abdomen: Images through the upper abdomen demonstrate no acute process. Soft Tissues/Bones: There is an acute nondisplaced fracture of the left lateral 7th rib. 1. Acute nondisplaced fracture of the left lateral 7th rib 2. Trace left pleural effusion.   No erosions or bony destructive changes. Mild degenerative changes to both hips, right more than left. Degenerative changes to both SI joints, right more than left. Multilevel degenerative changes to visualized lower lumbar spine. No acute soft tissue abnormalities. Multiple surgical clips within the pelvis bilaterally. No acute abnormality. If there is persistent clinical concern or difficulty weight-bearing consider MRI (rather than CT) given the degree of bone demineralization. Degenerative changes to both hips, both SI joints and to the visualized lower lumbar spine. ED COURSE & MEDICAL DECISION MAKING      Patient presents as above. Patient requiring oxygen, via EMS. Oxygen in the high 80s low 90s. Does not usually require oxygenation. Did have a low-grade fever on arrival.  States that she had worsening shortness of breath, cough and fever over the last several days. Is concerned because she had a recent rib fracture. She denies history of underlying pneumonia or recurrent pulmonary infections. Denies any recent exposures other than being in the emergency department. Denies chest pain shortness of breath. Work-up initiated. Work-up demonstrating signs of possible pneumonia to the left side with a left-sided pleural effusion. There is evidence of that subacute mildly displaced left lateral rib fracture as well. There were evidence of pulmonary nodules on the CT scan. Patient's lab work was overall reassuring. Patient still requiring oxygen. Will swab for COVID another respiratory disease panel for rule out. Patient will be given fluids, empiric antibiotics. She was also given breathing treatments. Remained stable while in the ED. Will admit to the hospitalist team for further management of her likely pneumonia, acute respiratory infection, oxygen requirement.     In consideration of current COVID19 pandemic, with effort to minimize unnecessary provider exposure, this patient was

## 2020-05-21 NOTE — ED PROVIDER NOTES
EKG shows sinus rhythm right bundle branch block rate of 75 no old EKG available for comparison      Diamante Gutierrez MD  05/21/20 0390

## 2020-05-21 NOTE — ACP (ADVANCE CARE PLANNING)
Advance Care Planning     Advance Care Planning Activator (Inpatient)  Conversation Note      Date of ACP Conversation: 5/21/2020    Conversation Conducted with: Patient with Decision Making Capacity    ACP Activator: [address] makes decisions on behalf of the incapacitated patient: Decision Maker is asked to consider and make decisions based on patient values, known preferences, or best interests. Health Care Decision Maker:     Current Designated Health Care Decision Maker:   Primary Decision Maker: Makenzie Campbell - Child - [phone]  (If there is a [address] named in the [facility] Makers\" box in the ACP activity, but it is not visible above, be sure to open that field and then select the health care decision maker relationship (ie \"primary\") in the blank space to the right of the name.) Validate  this information as still accurate & up-to-date; edit [place] field as needed.)    Note: Assess and validate information in current ACP documents, as indicated. If no Decision Maker listed above or available through scanned documents, then:    If no Authorized Decision Maker has previously been identified, then patient chooses [place]:  \"Who would you like to name as your primary health care decision-maker? \"               Name: Brianna Mckeesport        Relationship: DTR         Phone number: [phone]  Reggie Salinas this person be reached easily? \" Yes  \"Who would you like to name as your back-up decision maker? \"   Name:         Relationship:           Phone number:   \"Can this person be reached easily? \" NA    Note: If the relationship of these Decision-Makers to the patient does NOT follow your state's Next of Kin hierarchy, recommend that patient complete ACP document that meets state-specific requirements to allow them to act on the patient's behalf when appropriate. Care Preferences    Ventilation:   \"If you were in your

## 2020-05-21 NOTE — H&P
[facility]       History and Physical      Name:  Princess Gimenez /Age/Sex: 1945  (75 y.o. female)   MRN & CSN:  [id number] & [id number] Admission Date/Time: 2020  1:02 PM   Location:  ED01/ED-01 PCP: Selin Díaz MD       Hospital Day: 1    Assessment and Plan:   Princess Gimenez is a 75 y.o.  female with past medical history of depression, peripheral neuropathy who presents with shortness of breath    · Acute respiratory failure -likely from community-acquired pneumonia, need to rule out COVID-19, maintain full PPE precautions, droplet plus isolation, wean off oxygen, treat underlying pneumonia -we will use IV Rocephin, azithromycin and p.o. Plaquenil, order COVID-19 lab protocol -current QTc 447    · CAP - from home, no recent hospitalization, fever at home, no sepsis identified, CT chest suggestive of pneumonia , will start on IV Azithromycin and Rocephin, monitor blood and sputum cultures, urine for legionella and strep    · Hyponatremia - -monitor closely    · Fall at home -mechanical, with seventh rib fracture on the left lateral side -pain control, PT/OT, maintain fall precautions    Other comorbid conditions addressed include:  Depression  Tobacco use disorder    Diet No diet orders on file   DVT Prophylaxis [] Lovenox, []  Heparin, [] SCDs, [] No VTE prophylaxis, patient ambulating   GI Prophylaxis [] PPI, [] H2 Blocker, [] No GI prophylaxis, patient is receiving diet/Tube Feeds   Code Status No Order   Disposition Patient requires continued admission due to CAP    MDM [] Low, [] Moderate,[x]  High  Patient's risk as above due to CAP rule out COVID-19     History of Present Illness:     Chief Complaint: Shortness of breath    Princess Gimenez is a 75 y.o.  female  with past medical history of depression, peripheral neuropathy who presents with shortness of breath.   Patient stated that she fell about 2 weeks ago in her bathtub sustaining injury to the left lateral side of her chest.  She fell when she was trying to reach out for her soap and it was mechanical fall. There was no loss of consciousness nor any syncopal feeling before or after the fall. Since then she was having left-sided chest pain. Yesterday she developed some shortness of breath which continued to worsen forcing her to come to the ED. She reports a fever of 102 at home. Cough is nonproductive. Has some nausea but no vomiting. She denies any diarrhea. Admits to have been smoking tobacco-1 pack a day for several decades. Now she is trying to quit smoking and has been reducing her number of cigarettes consumed per day. Denies any contact with any people with COVID-19 or any other sick people. Ten point ROS reviewed negative, unless as noted above    Objective:   No intake or output data in the 24 hours ending 05/21/20 1753   Vitals:   Vitals:    05/21/20 1647   BP: (!) 106/47   Pulse: 74   Resp: 17   Temp: 98.5 °F (36.9 °C)   SpO2: 97%     Physical Exam:    GEN Awake female, resting in bed in no apparent distress. Appears given age. EYES Pupils are equally round. No scleral erythema, discharge, or conjunctivitis. HENT Mucous membranes are moist.   NECK No apparent thyromegaly or masses. RESP Clear to auscultation, no wheezes, rales or rhonchi. Symmetric chest movement while on room air. CARDIO/VASC S1/S2 auscultated. Regular rate without appreciable murmurs, rubs, or gallops. Peripheral pulses equal bilaterally and palpable. No peripheral edema. GI Abdomen is soft without significant tenderness, masses, or guarding. Bowel sounds are normoactive. Rectal exam deferred.  Montero catheter is not present. HEME/LYMPH No petechiae or ecchymoses. MSK No gross joint deformities. Spontaneous movement of all extremities  SKIN Normal coloration, warm, dry. NEURO Cranial nerves appear grossly intact, normal speech, no lateralizing weakness. PSYCH Awake, alert, oriented x 4. Affect appropriate.     Past Medical History:      Past Medical History:   Diagnosis Date    Anxiety     Back pain     Cancer ([facility] [address]     bladder    Cardiac abnormality     Falls      PSHX:  has a past surgical history that includes Bladder removal; sinus surgery; Foot surgery; Hysterectomy; and Inner ear surgery (Right, 1983). Allergies:    Allergies   Allergen Reactions    Contrast [Barium-Containing Compounds] Hives     HIVES       FAM HX: Reviewed but found to be noncontributory  Soc HX:   Social History     Socioeconomic History    Marital status:      Spouse name: None    Number of children: None    Years of education: None    Highest education level: None   Occupational History    None   Social Needs    Financial resource strain: None    Food insecurity     Worry: None     Inability: None    Transportation needs     Medical: None     Non-medical: None   Tobacco Use    Smoking status: Current Every Day Smoker     Packs/day: 1.00     Types: Cigarettes    Smokeless tobacco: Never Used   Substance and Sexual Activity    Alcohol use: Never     Frequency: Never    Drug use: Never    Sexual activity: None   Lifestyle    Physical activity     Days per week: None     Minutes per session: None    Stress: None   Relationships    Social connections     Talks on phone: None     Gets together: None     Attends Bahai service: None     Active member of club or organization: None     Attends meetings of clubs or organizations: None     Relationship status: None    Intimate partner violence     Fear of current or ex partner: None     Emotionally abused: None     Physically abused: None     Forced sexual activity: None   Other Topics Concern    None   Social History Narrative    None       Medications:   Medications:    azithromycin  500 mg Intravenous Once    cefTRIAXone (ROCEPHIN) IV  1 g Intravenous Once    enoxaparin  30 mg Subcutaneous BID    hydroxychloroquine  400 mg Oral Q12H    Followed by   Delia Marrufo ON 5/22/2020]

## 2020-05-22 LAB
ALBUMIN SERPL-MCNC: 3.2 GM/DL (ref 3.4–5)
ALP BLD-CCNC: 100 IU/L (ref 40–129)
ALT SERPL-CCNC: 7 U/L (ref 10–40)
ANION GAP SERPL CALCULATED.3IONS-SCNC: 12 MMOL/L (ref 4–16)
APTT: 48.7 SECONDS (ref 25.1–37.1)
AST SERPL-CCNC: 11 IU/L (ref 15–37)
BASOPHILS ABSOLUTE: 0 K/CU MM
BASOPHILS RELATIVE PERCENT: 0.1 % (ref 0–1)
BILIRUB SERPL-MCNC: 0.3 MG/DL (ref 0–1)
BUN BLDV-MCNC: 23 MG/DL (ref 6–23)
CALCIUM SERPL-MCNC: 8 MG/DL (ref 8.3–10.6)
CHLORIDE BLD-SCNC: 106 MMOL/L (ref 99–110)
CO2: 20 MMOL/L (ref 21–32)
CREAT SERPL-MCNC: 1 MG/DL (ref 0.6–1.1)
D DIMER: 957 NG/ML(DDU)
DIFFERENTIAL TYPE: ABNORMAL
EOSINOPHILS ABSOLUTE: 0 K/CU MM
EOSINOPHILS RELATIVE PERCENT: 0 % (ref 0–3)
FERRITIN: 554 NG/ML (ref 15–150)
FIBRINOGEN LEVEL: 725 MG/DL (ref 196.9–442.1)
GFR AFRICAN AMERICAN: >60 ML/MIN/1.73M2
GFR NON-AFRICAN AMERICAN: 54 ML/MIN/1.73M2
GLUCOSE BLD-MCNC: 113 MG/DL (ref 70–99)
HCT VFR BLD CALC: 35.6 % (ref 37–47)
HEMOGLOBIN: 12 GM/DL (ref 12.5–16)
HIGH SENSITIVE C-REACTIVE PROTEIN: >300 MG/L
IMMATURE NEUTROPHIL %: 0.3 % (ref 0–0.43)
INR BLD: 1.11 INDEX
LACTATE DEHYDROGENASE: 233 IU/L (ref 120–246)
LYMPHOCYTES ABSOLUTE: 0.5 K/CU MM
LYMPHOCYTES RELATIVE PERCENT: 5.9 % (ref 24–44)
MCH RBC QN AUTO: 35.7 PG (ref 27–31)
MCHC RBC AUTO-ENTMCNC: 33.7 % (ref 32–36)
MCV RBC AUTO: 106 FL (ref 78–100)
MONOCYTES ABSOLUTE: 0.6 K/CU MM
MONOCYTES RELATIVE PERCENT: 6.5 % (ref 0–4)
NUCLEATED RBC %: 0 %
PDW BLD-RTO: 13.1 % (ref 11.7–14.9)
PLATELET # BLD: 218 K/CU MM (ref 140–440)
PMV BLD AUTO: 8.8 FL (ref 7.5–11.1)
POTASSIUM SERPL-SCNC: 4.2 MMOL/L (ref 3.5–5.1)
PROCALCITONIN: 1.8
PROTHROMBIN TIME: 13.4 SECONDS (ref 11.7–14.5)
RBC # BLD: 3.36 M/CU MM (ref 4.2–5.4)
SEGMENTED NEUTROPHILS ABSOLUTE COUNT: 7.6 K/CU MM
SEGMENTED NEUTROPHILS RELATIVE PERCENT: 87.2 % (ref 36–66)
SODIUM BLD-SCNC: 138 MMOL/L (ref 135–145)
TOTAL IMMATURE NEUTOROPHIL: 0.03 K/CU MM
TOTAL NUCLEATED RBC: 0 K/CU MM
TOTAL PROTEIN: 5 GM/DL (ref 6.4–8.2)
WBC # BLD: 8.7 K/CU MM (ref 4–10.5)

## 2020-05-22 PROCEDURE — 6370000000 HC RX 637 (ALT 250 FOR IP): Performed by: INTERNAL MEDICINE

## 2020-05-22 PROCEDURE — 85384 FIBRINOGEN ACTIVITY: CPT

## 2020-05-22 PROCEDURE — 94761 N-INVAS EAR/PLS OXIMETRY MLT: CPT

## 2020-05-22 PROCEDURE — 85025 COMPLETE CBC W/AUTO DIFF WBC: CPT

## 2020-05-22 PROCEDURE — 1200000000 HC SEMI PRIVATE

## 2020-05-22 PROCEDURE — 2580000003 HC RX 258: Performed by: INTERNAL MEDICINE

## 2020-05-22 PROCEDURE — 85730 THROMBOPLASTIN TIME PARTIAL: CPT

## 2020-05-22 PROCEDURE — 80053 COMPREHEN METABOLIC PANEL: CPT

## 2020-05-22 PROCEDURE — 2700000000 HC OXYGEN THERAPY PER DAY

## 2020-05-22 PROCEDURE — 85379 FIBRIN DEGRADATION QUANT: CPT

## 2020-05-22 PROCEDURE — 6360000002 HC RX W HCPCS: Performed by: INTERNAL MEDICINE

## 2020-05-22 PROCEDURE — 85610 PROTHROMBIN TIME: CPT

## 2020-05-22 RX ORDER — VITS A,C,E/LUTEIN/MINERALS 300MCG-200
1 TABLET ORAL DAILY
Status: DISCONTINUED | OUTPATIENT
Start: 2020-05-22 | End: 2020-05-26 | Stop reason: HOSPADM

## 2020-05-22 RX ADMIN — ENOXAPARIN SODIUM 30 MG: 30 INJECTION SUBCUTANEOUS at 21:29

## 2020-05-22 RX ADMIN — CARBOXYMETHYLCELLULOSE SODIUM 1 DROP: 10 GEL OPHTHALMIC at 21:30

## 2020-05-22 RX ADMIN — HYDROXYCHLOROQUINE SULFATE 400 MG: 200 TABLET, FILM COATED ORAL at 04:36

## 2020-05-22 RX ADMIN — GABAPENTIN 800 MG: 400 CAPSULE ORAL at 06:13

## 2020-05-22 RX ADMIN — OXYCODONE HYDROCHLORIDE 5 MG: 5 TABLET ORAL at 21:28

## 2020-05-22 RX ADMIN — SODIUM CHLORIDE: 9 INJECTION, SOLUTION INTRAVENOUS at 11:17

## 2020-05-22 RX ADMIN — ESCITALOPRAM OXALATE 5 MG: 10 TABLET ORAL at 21:29

## 2020-05-22 RX ADMIN — ENOXAPARIN SODIUM 30 MG: 30 INJECTION SUBCUTANEOUS at 09:38

## 2020-05-22 RX ADMIN — GABAPENTIN 800 MG: 400 CAPSULE ORAL at 15:24

## 2020-05-22 RX ADMIN — SODIUM CHLORIDE, PRESERVATIVE FREE 10 ML: 5 INJECTION INTRAVENOUS at 09:38

## 2020-05-22 RX ADMIN — GABAPENTIN 800 MG: 400 CAPSULE ORAL at 21:28

## 2020-05-22 RX ADMIN — MELATONIN 6 MG: at 21:29

## 2020-05-22 RX ADMIN — ACETAMINOPHEN 650 MG: 325 TABLET ORAL at 21:48

## 2020-05-22 RX ADMIN — HYDROXYCHLOROQUINE SULFATE 200 MG: 200 TABLET ORAL at 21:28

## 2020-05-22 RX ADMIN — Medication 1 TABLET: at 21:28

## 2020-05-22 RX ADMIN — ASPIRIN 81 MG 81 MG: 81 TABLET ORAL at 09:38

## 2020-05-22 RX ADMIN — OXYCODONE HYDROCHLORIDE 5 MG: 5 TABLET ORAL at 16:33

## 2020-05-22 RX ADMIN — SODIUM CHLORIDE, PRESERVATIVE FREE 10 ML: 5 INJECTION INTRAVENOUS at 21:28

## 2020-05-22 RX ADMIN — CARBOXYMETHYLCELLULOSE SODIUM 1 DROP: 10 GEL OPHTHALMIC at 15:24

## 2020-05-22 ASSESSMENT — PAIN SCALES - GENERAL
PAINLEVEL_OUTOF10: 6
PAINLEVEL_OUTOF10: 7
PAINLEVEL_OUTOF10: 1
PAINLEVEL_OUTOF10: 4
PAINLEVEL_OUTOF10: 4

## 2020-05-22 ASSESSMENT — PAIN DESCRIPTION - ONSET: ONSET: ON-GOING

## 2020-05-22 ASSESSMENT — PAIN DESCRIPTION - PAIN TYPE
TYPE: ACUTE PAIN
TYPE: ACUTE PAIN

## 2020-05-22 ASSESSMENT — PAIN DESCRIPTION - PROGRESSION: CLINICAL_PROGRESSION: GRADUALLY WORSENING

## 2020-05-22 ASSESSMENT — PAIN DESCRIPTION - ORIENTATION
ORIENTATION: LEFT
ORIENTATION: RIGHT;LEFT

## 2020-05-22 ASSESSMENT — PAIN DESCRIPTION - DESCRIPTORS: DESCRIPTORS: ACHING;DISCOMFORT

## 2020-05-22 ASSESSMENT — PAIN DESCRIPTION - LOCATION
LOCATION: BACK
LOCATION: RIB CAGE

## 2020-05-22 ASSESSMENT — PAIN DESCRIPTION - FREQUENCY: FREQUENCY: INTERMITTENT

## 2020-05-22 NOTE — PROGRESS NOTES
Pt alert and oriented. Reports she sometimes has trouble remembering things. She lives with her daughter. Pt reports pain in her left rib cage from previous fall. Hx bladder cancer and has urostomy. Noted some redness between breasts and some scattered bruising with a large bruise on left arm. Generalized edema.

## 2020-05-22 NOTE — CARE COORDINATION
Received return call from patient's daughter Snehal Weller . She confirmed that patient is from home with her and normally uses a walker for mobility and is assisted with ADL's. Snehal eWller stated that patient has a HHA that comes from [facility] but is unable to recall the name of the agency that HHA comes from . Patient has PCP and [employer] and has no barriers with RX coverage . CM discussed discharge planning with Snehal Weller. She stated that plan is for patient to return home and Snehal Weller would like for patient to have skilled [facility] to include a nurse and physical therapy . Snehal Weller declined wanting  SNF on d/c. She stated that the agency that sends the home health aid doesn't have skilled [facility] . She is going to research which [facility] agency that she would like to use and return call to . Patient has all DME needed and no other discharge needs identified .

## 2020-05-22 NOTE — CARE COORDINATION
Phoned patient on COVID unit to discuss discharge planning . No answer. Phoned patient 's daughter listed as POC for patient in Epic and had to leave a VM. Per chart review patient is from home with her daughter , has PCP and Medicare/Medicaid health insurance . Case Management following to continue to assist with discharge plan.

## 2020-05-22 NOTE — PROGRESS NOTES
(Last 24 hours) at 5/22/2020 1243  Last data filed at 5/22/2020 6787  Gross per 24 hour   Intake 1146.66 ml   Output 925 ml   Net 221.66 ml      Vitals:   Vitals:    05/22/20 0900   BP: (!) 111/51   Pulse: 86   Resp: 18   Temp: 98.3 °F (36.8 °C)   SpO2:      Physical Exam:    GEN Awake female, resting in bed in no apparent distress. Appears given age. HENT Mucous membranes are moist.   RESP Clear to auscultation, no wheezes, rales or rhonchi. CARDIO/VASC -S1/S2 auscultated. Regular rate without appreciable murmurs, rubs, or gallops. Peripheral pulses equal bilaterally and palpable. No peripheral edema. GI ileostomy in place, abdomen is soft without significant tenderness, masses, or guarding. Bowel sounds are normoactive. Rectal exam deferred.  Montero catheter is not present.     Medications:   Medications:    azithromycin  500 mg Intravenous Once    enoxaparin  30 mg Subcutaneous BID    hydroxychloroquine  200 mg Oral Q12H    sodium chloride flush  10 mL Intravenous 2 times per day    aspirin  81 mg Oral Daily    carboxymethylcellulose  1 drop Both Eyes TID    escitalopram  5 mg Oral Nightly    gabapentin  800 mg Oral TID      Infusions:    sodium chloride 100 mL/hr at 05/22/20 1117     PRN Meds: acetaminophen, 650 mg, Q6H PRN    Or  acetaminophen, 650 mg, Q6H PRN  sodium chloride flush, 10 mL, PRN  acetaminophen, 650 mg, Q6H PRN    Or  acetaminophen, 650 mg, Q6H PRN  polyethylene glycol, 17 g, Daily PRN  promethazine, 12.5 mg, Q6H PRN    Or  ondansetron, 4 mg, Q6H PRN  melatonin, 6 mg, Nightly PRN  oxyCODONE, 5 mg, Q6H PRN          Electronically signed by Nita Dobson MD on 5/22/2020 at 12:43 PM

## 2020-05-23 LAB
APTT: 39.7 SECONDS (ref 25.1–37.1)
BASOPHILS ABSOLUTE: 0 K/CU MM
BASOPHILS RELATIVE PERCENT: 0.2 % (ref 0–1)
D DIMER: 756 NG/ML(DDU)
DIFFERENTIAL TYPE: ABNORMAL
EOSINOPHILS ABSOLUTE: 0.2 K/CU MM
EOSINOPHILS RELATIVE PERCENT: 2.7 % (ref 0–3)
HCT VFR BLD CALC: 37.7 % (ref 37–47)
HEMOGLOBIN: 12.4 GM/DL (ref 12.5–16)
IMMATURE NEUTROPHIL %: 0.4 % (ref 0–0.43)
INR BLD: 0.98 INDEX
LYMPHOCYTES ABSOLUTE: 0.9 K/CU MM
LYMPHOCYTES RELATIVE PERCENT: 16.9 % (ref 24–44)
MCH RBC QN AUTO: 35.2 PG (ref 27–31)
MCHC RBC AUTO-ENTMCNC: 32.9 % (ref 32–36)
MCV RBC AUTO: 107.1 FL (ref 78–100)
MONOCYTES ABSOLUTE: 0.7 K/CU MM
MONOCYTES RELATIVE PERCENT: 12.5 % (ref 0–4)
NUCLEATED RBC %: 0 %
PDW BLD-RTO: 13.2 % (ref 11.7–14.9)
PLATELET # BLD: 210 K/CU MM (ref 140–440)
PMV BLD AUTO: 8.7 FL (ref 7.5–11.1)
PROTHROMBIN TIME: 11.9 SECONDS (ref 11.7–14.5)
RBC # BLD: 3.52 M/CU MM (ref 4.2–5.4)
REASON FOR REJECTION: NORMAL
REJECTED TEST: NORMAL
SARS-COV-2: NOT DETECTED
SEGMENTED NEUTROPHILS ABSOLUTE COUNT: 3.7 K/CU MM
SEGMENTED NEUTROPHILS RELATIVE PERCENT: 67.3 % (ref 36–66)
SOURCE: NORMAL
TOTAL IMMATURE NEUTOROPHIL: 0.02 K/CU MM
TOTAL NUCLEATED RBC: 0 K/CU MM
WBC # BLD: 5.5 K/CU MM (ref 4–10.5)

## 2020-05-23 PROCEDURE — 85730 THROMBOPLASTIN TIME PARTIAL: CPT

## 2020-05-23 PROCEDURE — 2580000003 HC RX 258: Performed by: INTERNAL MEDICINE

## 2020-05-23 PROCEDURE — 76937 US GUIDE VASCULAR ACCESS: CPT

## 2020-05-23 PROCEDURE — 6370000000 HC RX 637 (ALT 250 FOR IP): Performed by: INTERNAL MEDICINE

## 2020-05-23 PROCEDURE — 85610 PROTHROMBIN TIME: CPT

## 2020-05-23 PROCEDURE — 6360000002 HC RX W HCPCS: Performed by: INTERNAL MEDICINE

## 2020-05-23 PROCEDURE — 1200000000 HC SEMI PRIVATE

## 2020-05-23 PROCEDURE — 2700000000 HC OXYGEN THERAPY PER DAY

## 2020-05-23 PROCEDURE — 80053 COMPREHEN METABOLIC PANEL: CPT

## 2020-05-23 PROCEDURE — 85025 COMPLETE CBC W/AUTO DIFF WBC: CPT

## 2020-05-23 PROCEDURE — 85379 FIBRIN DEGRADATION QUANT: CPT

## 2020-05-23 PROCEDURE — 94761 N-INVAS EAR/PLS OXIMETRY MLT: CPT

## 2020-05-23 RX ADMIN — ENOXAPARIN SODIUM 30 MG: 30 INJECTION SUBCUTANEOUS at 10:26

## 2020-05-23 RX ADMIN — CARBOXYMETHYLCELLULOSE SODIUM 1 DROP: 10 GEL OPHTHALMIC at 22:36

## 2020-05-23 RX ADMIN — Medication 1 TABLET: at 10:26

## 2020-05-23 RX ADMIN — GABAPENTIN 800 MG: 400 CAPSULE ORAL at 22:35

## 2020-05-23 RX ADMIN — GABAPENTIN 800 MG: 400 CAPSULE ORAL at 15:09

## 2020-05-23 RX ADMIN — ESCITALOPRAM OXALATE 5 MG: 10 TABLET ORAL at 22:34

## 2020-05-23 RX ADMIN — OXYCODONE HYDROCHLORIDE 5 MG: 5 TABLET ORAL at 18:00

## 2020-05-23 RX ADMIN — SODIUM CHLORIDE, PRESERVATIVE FREE 10 ML: 5 INJECTION INTRAVENOUS at 22:35

## 2020-05-23 RX ADMIN — HYDROXYCHLOROQUINE SULFATE 200 MG: 200 TABLET ORAL at 10:26

## 2020-05-23 RX ADMIN — CEFEPIME 2 G: 2 INJECTION, POWDER, FOR SOLUTION INTRAVENOUS at 22:32

## 2020-05-23 RX ADMIN — CARBOXYMETHYLCELLULOSE SODIUM 1 DROP: 10 GEL OPHTHALMIC at 15:09

## 2020-05-23 RX ADMIN — ASPIRIN 81 MG 81 MG: 81 TABLET ORAL at 10:26

## 2020-05-23 RX ADMIN — GABAPENTIN 800 MG: 400 CAPSULE ORAL at 06:33

## 2020-05-23 RX ADMIN — CEFEPIME 2 G: 2 INJECTION, POWDER, FOR SOLUTION INTRAVENOUS at 11:42

## 2020-05-23 RX ADMIN — ENOXAPARIN SODIUM 30 MG: 30 INJECTION SUBCUTANEOUS at 22:35

## 2020-05-23 RX ADMIN — OXYCODONE HYDROCHLORIDE 5 MG: 5 TABLET ORAL at 06:33

## 2020-05-23 RX ADMIN — CARBOXYMETHYLCELLULOSE SODIUM 1 DROP: 10 GEL OPHTHALMIC at 11:44

## 2020-05-23 ASSESSMENT — PAIN DESCRIPTION - PAIN TYPE
TYPE: CHRONIC PAIN;ACUTE PAIN
TYPE: ACUTE PAIN;CHRONIC PAIN

## 2020-05-23 ASSESSMENT — PAIN DESCRIPTION - PROGRESSION
CLINICAL_PROGRESSION: NOT CHANGED

## 2020-05-23 ASSESSMENT — PAIN SCALES - GENERAL
PAINLEVEL_OUTOF10: 0
PAINLEVEL_OUTOF10: 7
PAINLEVEL_OUTOF10: 0
PAINLEVEL_OUTOF10: 3
PAINLEVEL_OUTOF10: 6

## 2020-05-23 ASSESSMENT — PAIN DESCRIPTION - ORIENTATION
ORIENTATION: LEFT
ORIENTATION: LEFT

## 2020-05-23 ASSESSMENT — PAIN DESCRIPTION - FREQUENCY
FREQUENCY: INTERMITTENT
FREQUENCY: INTERMITTENT

## 2020-05-23 ASSESSMENT — PAIN DESCRIPTION - DESCRIPTORS
DESCRIPTORS: ACHING;CONSTANT;SHARP
DESCRIPTORS: ACHING;SHARP

## 2020-05-23 ASSESSMENT — PAIN - FUNCTIONAL ASSESSMENT
PAIN_FUNCTIONAL_ASSESSMENT: ACTIVITIES ARE NOT PREVENTED
PAIN_FUNCTIONAL_ASSESSMENT: ACTIVITIES ARE NOT PREVENTED

## 2020-05-23 ASSESSMENT — PAIN DESCRIPTION - DIRECTION
RADIATING_TOWARDS: RIBCAGE
RADIATING_TOWARDS: RIBCAGE

## 2020-05-23 ASSESSMENT — PAIN DESCRIPTION - ONSET
ONSET: ON-GOING
ONSET: ON-GOING

## 2020-05-23 ASSESSMENT — PAIN DESCRIPTION - LOCATION
LOCATION: HIP
LOCATION: HIP

## 2020-05-23 NOTE — PROGRESS NOTES
bedside      Patient was resting in bed with no distress   Denies any shortness of breath or chest pain  She has remained afebrile    Ten point ROS reviewed negative, unless as noted above    Objective: Intake/Output Summary (Last 24 hours) at 5/23/2020 1003  Last data filed at 5/23/2020 0739  Gross per 24 hour   Intake 240 ml   Output 1700 ml   Net -1460 ml      Vitals:   Vitals:    05/23/20 0829   BP: (!) 99/49   Pulse: 84   Resp: 15   Temp: 98 °F (36.7 °C)   SpO2: 94%     Physical Exam:    GEN Awake female, resting in bed in no apparent distress. Appears given age. HENT Mucous membranes are moist.   RESP Clear to auscultation, no wheezes, rales or rhonchi. CARDIO/VASC -S1/S2 auscultated. Regular rate without appreciable murmurs, rubs, or gallops. Peripheral pulses equal bilaterally and palpable. No peripheral edema. GI ileostomy in place, abdomen is soft without significant tenderness, masses, or guarding. Bowel sounds are normoactive. Rectal exam deferred.  Montero catheter is not present.     Medications:   Medications:    cefepime  2 g Intravenous Q12H    ocuvite-lutein  1 tablet Oral Daily    azithromycin  500 mg Intravenous Once    enoxaparin  30 mg Subcutaneous BID    hydroxychloroquine  200 mg Oral Q12H    sodium chloride flush  10 mL Intravenous 2 times per day    aspirin  81 mg Oral Daily    carboxymethylcellulose  1 drop Both Eyes TID    escitalopram  5 mg Oral Nightly    gabapentin  800 mg Oral TID      Infusions:    sodium chloride 100 mL/hr at 05/22/20 1117     PRN Meds: acetaminophen, 650 mg, Q6H PRN    Or  acetaminophen, 650 mg, Q6H PRN  sodium chloride flush, 10 mL, PRN  acetaminophen, 650 mg, Q6H PRN    Or  acetaminophen, 650 mg, Q6H PRN  polyethylene glycol, 17 g, Daily PRN  promethazine, 12.5 mg, Q6H PRN    Or  ondansetron, 4 mg, Q6H PRN  melatonin, 6 mg, Nightly PRN  oxyCODONE, 5 mg, Q6H PRN          Electronically signed by Zuri Schultz MD on 5/23/2020 at 10:03 AM

## 2020-05-24 LAB
CULTURE: ABNORMAL
Lab: ABNORMAL
SPECIMEN: ABNORMAL
TOTAL COLONY COUNT: ABNORMAL

## 2020-05-24 PROCEDURE — 1200000000 HC SEMI PRIVATE

## 2020-05-24 PROCEDURE — 2580000003 HC RX 258: Performed by: INTERNAL MEDICINE

## 2020-05-24 PROCEDURE — 2700000000 HC OXYGEN THERAPY PER DAY

## 2020-05-24 PROCEDURE — 6370000000 HC RX 637 (ALT 250 FOR IP): Performed by: INTERNAL MEDICINE

## 2020-05-24 PROCEDURE — 6360000002 HC RX W HCPCS: Performed by: INTERNAL MEDICINE

## 2020-05-24 PROCEDURE — 94761 N-INVAS EAR/PLS OXIMETRY MLT: CPT

## 2020-05-24 RX ADMIN — CARBOXYMETHYLCELLULOSE SODIUM 1 DROP: 10 GEL OPHTHALMIC at 10:10

## 2020-05-24 RX ADMIN — CARBOXYMETHYLCELLULOSE SODIUM 1 DROP: 10 GEL OPHTHALMIC at 14:50

## 2020-05-24 RX ADMIN — CEFEPIME 2 G: 2 INJECTION, POWDER, FOR SOLUTION INTRAVENOUS at 22:51

## 2020-05-24 RX ADMIN — ENOXAPARIN SODIUM 30 MG: 30 INJECTION SUBCUTANEOUS at 10:05

## 2020-05-24 RX ADMIN — SODIUM CHLORIDE, PRESERVATIVE FREE 10 ML: 5 INJECTION INTRAVENOUS at 21:20

## 2020-05-24 RX ADMIN — CEFEPIME 2 G: 2 INJECTION, POWDER, FOR SOLUTION INTRAVENOUS at 10:04

## 2020-05-24 RX ADMIN — ACETAMINOPHEN 650 MG: 325 TABLET ORAL at 03:39

## 2020-05-24 RX ADMIN — CARBOXYMETHYLCELLULOSE SODIUM 1 DROP: 10 GEL OPHTHALMIC at 21:20

## 2020-05-24 RX ADMIN — GABAPENTIN 800 MG: 400 CAPSULE ORAL at 06:11

## 2020-05-24 RX ADMIN — OXYCODONE HYDROCHLORIDE 5 MG: 5 TABLET ORAL at 17:32

## 2020-05-24 RX ADMIN — ESCITALOPRAM OXALATE 5 MG: 10 TABLET ORAL at 21:19

## 2020-05-24 RX ADMIN — OXYCODONE HYDROCHLORIDE 5 MG: 5 TABLET ORAL at 10:04

## 2020-05-24 RX ADMIN — SODIUM CHLORIDE, PRESERVATIVE FREE 10 ML: 5 INJECTION INTRAVENOUS at 10:09

## 2020-05-24 RX ADMIN — GABAPENTIN 800 MG: 400 CAPSULE ORAL at 14:50

## 2020-05-24 RX ADMIN — GABAPENTIN 800 MG: 400 CAPSULE ORAL at 21:19

## 2020-05-24 RX ADMIN — ASPIRIN 81 MG 81 MG: 81 TABLET ORAL at 10:05

## 2020-05-24 ASSESSMENT — PAIN SCALES - GENERAL
PAINLEVEL_OUTOF10: 0
PAINLEVEL_OUTOF10: 6
PAINLEVEL_OUTOF10: 5
PAINLEVEL_OUTOF10: 6
PAINLEVEL_OUTOF10: 0
PAINLEVEL_OUTOF10: 5
PAINLEVEL_OUTOF10: 7
PAINLEVEL_OUTOF10: 0

## 2020-05-24 ASSESSMENT — PAIN DESCRIPTION - DESCRIPTORS
DESCRIPTORS: ACHING;SHARP
DESCRIPTORS: ACHING
DESCRIPTORS: ACHING

## 2020-05-24 ASSESSMENT — PAIN - FUNCTIONAL ASSESSMENT
PAIN_FUNCTIONAL_ASSESSMENT: ACTIVITIES ARE NOT PREVENTED

## 2020-05-24 ASSESSMENT — PAIN DESCRIPTION - PAIN TYPE
TYPE: ACUTE PAIN

## 2020-05-24 ASSESSMENT — PAIN DESCRIPTION - LOCATION
LOCATION: ANKLE
LOCATION: ANKLE
LOCATION: ABDOMEN

## 2020-05-24 ASSESSMENT — PAIN DESCRIPTION - ONSET
ONSET: ON-GOING

## 2020-05-24 ASSESSMENT — PAIN DESCRIPTION - FREQUENCY
FREQUENCY: INTERMITTENT

## 2020-05-24 ASSESSMENT — PAIN DESCRIPTION - PROGRESSION
CLINICAL_PROGRESSION: NOT CHANGED

## 2020-05-24 ASSESSMENT — PAIN DESCRIPTION - DIRECTION
RADIATING_TOWARDS: RIBCAGE

## 2020-05-24 ASSESSMENT — PAIN DESCRIPTION - ORIENTATION
ORIENTATION: LEFT;MID
ORIENTATION: LEFT;MID
ORIENTATION: MID;LEFT

## 2020-05-24 NOTE — PROGRESS NOTES
[address]    Hospitalist Progress Note      Name:  Rosa Maria Kelley /Age/Sex: 1945  (75 y.o. female)   MRN & CSN:  [id number] & [id number] Admission Date/Time: 2020  1:02 PM   Location:  [address] [place] [address]- PCP: Maurice Doran MD         Hospital Day: 4    Assessment and Plan:   Rosa Maria Kelley is a 75 y.o.  female  with past medical history of depression, peripheral neuropathy who presents with shortness of breath     Acute respiratory failure -likely from community-acquired pneumonia,     Need to rule out COVID-19, maintain full PPE precautions, droplet plus isolation, wean off oxygen, treat underlying pneumonia -  Continue with empiric antibiotics   COVID test results - NOT detected      Community acquired pneumonia    Grams status unknown, no sepsis on admission  CT chest suggestive of pneumonia ,   Continue with empiric IV Azithromycin and Cefepime ,   Monitor blood and sputum cultures, urine for legionella and strep    Urinary tract infection     S/p Bladder resection with Diverting ileostomy /Ileal conduit   Urine culture growing Coliforms and pseudomonas species   Continue with IV Cefepime  -(1 dose of Rocephin on ) pending culture and sensitivity     Hyponatremia -  -resolved >138     Fall at home - mechanical, with seventh rib fracture on the left lateral side -pain control, PT/OT, maintain fall precautions     Chronic  conditions addressed include:  Depression  Tobacco use disorder  Hx of Bladder Ca S/p Bladder resection and diverting ileostomy/ileal conduit  S/p Hysterectomy      Diet DIET GENERAL;   DVT Prophylaxis [] Lovenox, []  Heparin, [] SCDs, []No VTE prophylaxis, patient ambulating   GI Prophylaxis [] PPI, [] H2 Blocker, [] No GI prophylaxis, patient is receiving diet/Tube Feeds   Code Status Full Code   Disposition Patient requires continued admission due to respiratory failure/pneumonia/COVID   MDM [] Low, [x] Moderate,[]  High     History of Present Illness: Subjective

## 2020-05-25 LAB
ANION GAP SERPL CALCULATED.3IONS-SCNC: 12 MMOL/L (ref 4–16)
BASOPHILS ABSOLUTE: 0 K/CU MM
BASOPHILS RELATIVE PERCENT: 0.3 % (ref 0–1)
BUN BLDV-MCNC: 17 MG/DL (ref 6–23)
CALCIUM SERPL-MCNC: 9.2 MG/DL (ref 8.3–10.6)
CHLORIDE BLD-SCNC: 107 MMOL/L (ref 99–110)
CO2: 23 MMOL/L (ref 21–32)
CREAT SERPL-MCNC: 0.7 MG/DL (ref 0.6–1.1)
DIFFERENTIAL TYPE: ABNORMAL
EOSINOPHILS ABSOLUTE: 0.1 K/CU MM
EOSINOPHILS RELATIVE PERCENT: 1.7 % (ref 0–3)
GFR AFRICAN AMERICAN: >60 ML/MIN/1.73M2
GFR NON-AFRICAN AMERICAN: >60 ML/MIN/1.73M2
GLUCOSE BLD-MCNC: 98 MG/DL (ref 70–99)
HCT VFR BLD CALC: 36.7 % (ref 37–47)
HEMOGLOBIN: 12.4 GM/DL (ref 12.5–16)
IMMATURE NEUTROPHIL %: 0.2 % (ref 0–0.43)
LYMPHOCYTES ABSOLUTE: 1.2 K/CU MM
LYMPHOCYTES RELATIVE PERCENT: 18.2 % (ref 24–44)
MCH RBC QN AUTO: 34.5 PG (ref 27–31)
MCHC RBC AUTO-ENTMCNC: 33.8 % (ref 32–36)
MCV RBC AUTO: 102.2 FL (ref 78–100)
MONOCYTES ABSOLUTE: 0.9 K/CU MM
MONOCYTES RELATIVE PERCENT: 13.5 % (ref 0–4)
NUCLEATED RBC %: 0 %
PDW BLD-RTO: 12.7 % (ref 11.7–14.9)
PLATELET # BLD: 240 K/CU MM (ref 140–440)
PMV BLD AUTO: 8.5 FL (ref 7.5–11.1)
POTASSIUM SERPL-SCNC: 4.6 MMOL/L (ref 3.5–5.1)
RBC # BLD: 3.59 M/CU MM (ref 4.2–5.4)
SEGMENTED NEUTROPHILS ABSOLUTE COUNT: 4.3 K/CU MM
SEGMENTED NEUTROPHILS RELATIVE PERCENT: 66.1 % (ref 36–66)
SODIUM BLD-SCNC: 142 MMOL/L (ref 135–145)
TOTAL IMMATURE NEUTOROPHIL: 0.01 K/CU MM
TOTAL NUCLEATED RBC: 0 K/CU MM
WBC # BLD: 6.5 K/CU MM (ref 4–10.5)

## 2020-05-25 PROCEDURE — 36415 COLL VENOUS BLD VENIPUNCTURE: CPT

## 2020-05-25 PROCEDURE — 97535 SELF CARE MNGMENT TRAINING: CPT

## 2020-05-25 PROCEDURE — 85025 COMPLETE CBC W/AUTO DIFF WBC: CPT

## 2020-05-25 PROCEDURE — 97530 THERAPEUTIC ACTIVITIES: CPT

## 2020-05-25 PROCEDURE — 1200000000 HC SEMI PRIVATE

## 2020-05-25 PROCEDURE — 6360000002 HC RX W HCPCS: Performed by: INTERNAL MEDICINE

## 2020-05-25 PROCEDURE — 97166 OT EVAL MOD COMPLEX 45 MIN: CPT

## 2020-05-25 PROCEDURE — 80048 BASIC METABOLIC PNL TOTAL CA: CPT

## 2020-05-25 PROCEDURE — 6370000000 HC RX 637 (ALT 250 FOR IP): Performed by: INTERNAL MEDICINE

## 2020-05-25 PROCEDURE — 94761 N-INVAS EAR/PLS OXIMETRY MLT: CPT

## 2020-05-25 PROCEDURE — 2700000000 HC OXYGEN THERAPY PER DAY

## 2020-05-25 PROCEDURE — 2580000003 HC RX 258: Performed by: INTERNAL MEDICINE

## 2020-05-25 RX ADMIN — ESCITALOPRAM OXALATE 5 MG: 10 TABLET ORAL at 23:40

## 2020-05-25 RX ADMIN — OXYCODONE HYDROCHLORIDE 5 MG: 5 TABLET ORAL at 11:36

## 2020-05-25 RX ADMIN — CEFEPIME 2 G: 2 INJECTION, POWDER, FOR SOLUTION INTRAVENOUS at 11:32

## 2020-05-25 RX ADMIN — GABAPENTIN 800 MG: 400 CAPSULE ORAL at 05:30

## 2020-05-25 RX ADMIN — SODIUM CHLORIDE, PRESERVATIVE FREE 10 ML: 5 INJECTION INTRAVENOUS at 23:40

## 2020-05-25 RX ADMIN — Medication 1 TABLET: at 08:57

## 2020-05-25 RX ADMIN — OXYCODONE HYDROCHLORIDE 5 MG: 5 TABLET ORAL at 18:48

## 2020-05-25 RX ADMIN — CARBOXYMETHYLCELLULOSE SODIUM 1 DROP: 10 GEL OPHTHALMIC at 08:59

## 2020-05-25 RX ADMIN — CARBOXYMETHYLCELLULOSE SODIUM 1 DROP: 10 GEL OPHTHALMIC at 23:41

## 2020-05-25 RX ADMIN — GABAPENTIN 800 MG: 400 CAPSULE ORAL at 23:40

## 2020-05-25 RX ADMIN — CEFEPIME 2 G: 2 INJECTION, POWDER, FOR SOLUTION INTRAVENOUS at 23:39

## 2020-05-25 RX ADMIN — ASPIRIN 81 MG 81 MG: 81 TABLET ORAL at 08:57

## 2020-05-25 RX ADMIN — GABAPENTIN 800 MG: 400 CAPSULE ORAL at 13:51

## 2020-05-25 RX ADMIN — SODIUM CHLORIDE, PRESERVATIVE FREE 10 ML: 5 INJECTION INTRAVENOUS at 11:33

## 2020-05-25 RX ADMIN — CARBOXYMETHYLCELLULOSE SODIUM 1 DROP: 10 GEL OPHTHALMIC at 13:52

## 2020-05-25 RX ADMIN — ENOXAPARIN SODIUM 40 MG: 40 INJECTION SUBCUTANEOUS at 08:58

## 2020-05-25 ASSESSMENT — PAIN DESCRIPTION - ONSET
ONSET: ON-GOING
ONSET: ON-GOING

## 2020-05-25 ASSESSMENT — PAIN DESCRIPTION - DESCRIPTORS
DESCRIPTORS: SHARP
DESCRIPTORS: SHARP

## 2020-05-25 ASSESSMENT — PAIN SCALES - GENERAL
PAINLEVEL_OUTOF10: 4
PAINLEVEL_OUTOF10: 0
PAINLEVEL_OUTOF10: 7
PAINLEVEL_OUTOF10: 0
PAINLEVEL_OUTOF10: 5
PAINLEVEL_OUTOF10: 0

## 2020-05-25 ASSESSMENT — PAIN DESCRIPTION - ORIENTATION
ORIENTATION: RIGHT
ORIENTATION: LEFT

## 2020-05-25 ASSESSMENT — PAIN DESCRIPTION - PAIN TYPE
TYPE: ACUTE PAIN
TYPE: ACUTE PAIN

## 2020-05-25 ASSESSMENT — PAIN DESCRIPTION - PROGRESSION
CLINICAL_PROGRESSION: GRADUALLY WORSENING
CLINICAL_PROGRESSION: GRADUALLY WORSENING

## 2020-05-25 ASSESSMENT — PAIN DESCRIPTION - LOCATION
LOCATION: RIB CAGE
LOCATION: RIB CAGE

## 2020-05-25 ASSESSMENT — PAIN DESCRIPTION - FREQUENCY
FREQUENCY: CONTINUOUS
FREQUENCY: CONTINUOUS

## 2020-05-25 ASSESSMENT — PAIN DESCRIPTION - DIRECTION: RADIATING_TOWARDS: ACROSS

## 2020-05-25 NOTE — CONSULTS
in her shower and re-assessing her chair and its positioning in the shower. Pt currently requires assist to bathe d/t pain associated with rib fx. Pt fearful of going into her shower after fall and receives assist with sponge bath on main level of home). · LB bathing: ModA (pt requires assist d/t increased pain when bending related to rib fx). · UB dressing: Sherrill  · LB dressin [facility] [place] (pt reports she has not been able to LB dress d/t pain related to rib fx). · Toileting: SBA    Cognitive and Psychosocial Functioning:  · Overall cognitive status: WFL (self, time, location)  · Affect: Normal     Balance:   · Sitting: Supervision (demo good dynamic sitting balance at EOB)  · Standing: SBA (stands from EOB with RW, demo good dynamic standing balance at sink with AD, reports increased fatigue in standing). Functional Mobility:  · Bed Mobility: Sherrill (supine to seated bed mobility, pt requires assist with trunk support d/t increased pain from rib fx). · Transfers: SBA (from EOB with RW). · Ambulation: SBA (pt ambulated HH distance with RW, reports increased fatigue with ambulation and states her legs feel weak. Pt reports she used to take walks often, however this has decreased since her rib fx and onset of pneumonia). AM-PAC 6 click short form for inpatient daily activity:   How much help from another person does the patient currently need. .. Unable  Dep A Lot  Max A A Lot   Mod A A Little  Min A A Little   CGA  SBA None   Mod I  Indep  Sup   1. Putting on and taking off regular lower body clothing? [] 1    [] 2   [x] 2   [] 3   [] 3   [] 4      2. Bathing (including washing, rinsing, drying)? [] 1   [] 2   [x] 2 [] 3 [] 3 [] 4   3. Toileting, which includes using toilet, bedpan, or urinal? [] 1    [] 2   [] 2   [] 3   [x] 3   [] 4     4. Putting on and taking off regular upper body clothing? [] 1   [] 2   [] 2   [x] 3   [] 3    [] 4      5. Taking care of personal grooming such as brushing teeth?  [] 1

## 2020-05-25 NOTE — PROGRESS NOTES
resting in her recliner  with no distress -denies any chest pain  Aching sensation - right ear    Ten point ROS reviewed negative, unless as noted above    Objective: Intake/Output Summary (Last 24 hours) at 5/25/2020 1236  Last data filed at 5/25/2020 0903  Gross per 24 hour   Intake 760 ml   Output 5200 ml   Net -4440 ml      Vitals:   Vitals:    05/25/20 0800   BP: 134/64   Pulse: 71   Resp: 16   Temp: 99.5 °F (37.5 °C)   SpO2: 94%     Physical Exam:    GEN Awake female, resting in bed in no apparent distress. Appears given age. HENT Mucous membranes are moist.   RESP Clear to auscultation, no wheezes, rales or rhonchi. CARDIO/VASC -S1/S2 auscultated. Regular rate without appreciable murmurs, rubs, or gallops. Peripheral pulses equal bilaterally and palpable. No peripheral edema. GI ileostomy in place, abdomen is soft without significant tenderness, masses, or guarding. Bowel sounds are normoactive. Rectal exam deferred.  Montero catheter is not present.     Medications:   Medications:    enoxaparin  40 mg Subcutaneous Daily    cefepime  2 g Intravenous Q12H    ocuvite-lutein  1 tablet Oral Daily    azithromycin  500 mg Intravenous Once    sodium chloride flush  10 mL Intravenous 2 times per day    aspirin  81 mg Oral Daily    carboxymethylcellulose  1 drop Both Eyes TID    escitalopram  5 mg Oral Nightly    gabapentin  800 mg Oral TID      Infusions:     PRN Meds: acetaminophen, 650 mg, Q6H PRN    Or  acetaminophen, 650 mg, Q6H PRN  sodium chloride flush, 10 mL, PRN  polyethylene glycol, 17 g, Daily PRN  promethazine, 12.5 mg, Q6H PRN    Or  ondansetron, 4 mg, Q6H PRN  melatonin, 6 mg, Nightly PRN  oxyCODONE, 5 mg, Q6H PRN          Electronically signed by Ashia Rizo MD on 5/25/2020 at 12:36 PM

## 2020-05-26 VITALS
WEIGHT: 141 LBS | HEART RATE: 71 BPM | SYSTOLIC BLOOD PRESSURE: 136 MMHG | TEMPERATURE: 98.7 F | HEIGHT: 60 IN | DIASTOLIC BLOOD PRESSURE: 63 MMHG | OXYGEN SATURATION: 95 % | BODY MASS INDEX: 27.68 KG/M2 | RESPIRATION RATE: 16 BRPM

## 2020-05-26 LAB
CULTURE: NORMAL
CULTURE: NORMAL
Lab: NORMAL
Lab: NORMAL
SPECIMEN: NORMAL
SPECIMEN: NORMAL

## 2020-05-26 PROCEDURE — 6370000000 HC RX 637 (ALT 250 FOR IP): Performed by: INTERNAL MEDICINE

## 2020-05-26 PROCEDURE — 2580000003 HC RX 258: Performed by: INTERNAL MEDICINE

## 2020-05-26 PROCEDURE — 94761 N-INVAS EAR/PLS OXIMETRY MLT: CPT

## 2020-05-26 PROCEDURE — 6360000002 HC RX W HCPCS: Performed by: INTERNAL MEDICINE

## 2020-05-26 RX ORDER — CIPROFLOXACIN 500 MG/1
500 TABLET, FILM COATED ORAL 2 TIMES DAILY
Qty: 10 TABLET | Refills: 0 | Status: SHIPPED | OUTPATIENT
Start: 2020-05-26 | End: 2020-05-31

## 2020-05-26 RX ORDER — BENZOCAINE AND DEXTROMETHORPHAN HYDROBROMIDE 7.5; 5 MG/1; MG/1
1 LOZENGE ORAL
Qty: 30 LOZENGE | Refills: 1 | Status: SHIPPED | OUTPATIENT
Start: 2020-05-26

## 2020-05-26 RX ADMIN — GABAPENTIN 800 MG: 400 CAPSULE ORAL at 13:48

## 2020-05-26 RX ADMIN — Medication 1 TABLET: at 08:22

## 2020-05-26 RX ADMIN — GABAPENTIN 800 MG: 400 CAPSULE ORAL at 05:52

## 2020-05-26 RX ADMIN — ENOXAPARIN SODIUM 40 MG: 40 INJECTION SUBCUTANEOUS at 08:22

## 2020-05-26 RX ADMIN — ASPIRIN 81 MG 81 MG: 81 TABLET ORAL at 08:22

## 2020-05-26 RX ADMIN — OXYCODONE HYDROCHLORIDE 5 MG: 5 TABLET ORAL at 05:52

## 2020-05-26 RX ADMIN — CARBOXYMETHYLCELLULOSE SODIUM 1 DROP: 10 GEL OPHTHALMIC at 08:23

## 2020-05-26 RX ADMIN — SODIUM CHLORIDE, PRESERVATIVE FREE 10 ML: 5 INJECTION INTRAVENOUS at 08:23

## 2020-05-26 RX ADMIN — CARBOXYMETHYLCELLULOSE SODIUM 1 DROP: 10 GEL OPHTHALMIC at 13:49

## 2020-05-26 RX ADMIN — CEFEPIME 2 G: 2 INJECTION, POWDER, FOR SOLUTION INTRAVENOUS at 11:40

## 2020-05-26 RX ADMIN — OXYCODONE HYDROCHLORIDE 5 MG: 5 TABLET ORAL at 12:05

## 2020-05-26 ASSESSMENT — PAIN SCALES - GENERAL
PAINLEVEL_OUTOF10: 0
PAINLEVEL_OUTOF10: 5
PAINLEVEL_OUTOF10: 4
PAINLEVEL_OUTOF10: 0

## 2020-05-26 ASSESSMENT — PAIN DESCRIPTION - PAIN TYPE: TYPE: ACUTE PAIN

## 2020-05-26 ASSESSMENT — PAIN DESCRIPTION - DESCRIPTORS: DESCRIPTORS: SHARP

## 2020-05-26 ASSESSMENT — PAIN - FUNCTIONAL ASSESSMENT: PAIN_FUNCTIONAL_ASSESSMENT: ACTIVITIES ARE NOT PREVENTED

## 2020-05-26 ASSESSMENT — PAIN DESCRIPTION - PROGRESSION: CLINICAL_PROGRESSION: GRADUALLY WORSENING

## 2020-05-26 ASSESSMENT — PAIN DESCRIPTION - ONSET: ONSET: ON-GOING

## 2020-05-26 ASSESSMENT — PAIN DESCRIPTION - DIRECTION: RADIATING_TOWARDS: ACROSS

## 2020-05-26 ASSESSMENT — PAIN DESCRIPTION - LOCATION: LOCATION: RIB CAGE

## 2020-05-26 ASSESSMENT — PAIN DESCRIPTION - FREQUENCY: FREQUENCY: CONTINUOUS

## 2020-05-26 ASSESSMENT — PAIN DESCRIPTION - ORIENTATION: ORIENTATION: RIGHT

## 2020-05-26 NOTE — DISCHARGE SUMMARY
female, resting in bed in no apparent distress. Appears given age. RESP Clear to auscultation, no wheezes, rales or rhonchi. CARDIO/VAS - S1/S2 auscultated. Regular rate without appreciable murmurs, rubs, or gallops. Peripheral pulses equal bilaterally and palpable. No peripheral edema. GI Abdomen is soft without significant tenderness, masses, or guarding.  Bowel sounds are normoactive    BMP/CBC  Recent Labs     05/25/20  0856      K 4.6      CO2 23   BUN 17   CREATININE 0.7   WBC 6.5   HCT 36.7*            Discharge Time of 31 minutes    Electronically signed by Indira Rowe MD on 5/26/2020 at 11:43 AM

## 2020-05-26 NOTE — PLAN OF CARE
Problem: Falls - Risk of:  Goal: Will remain free from falls  Description: Will remain free from falls  5/26/2020 1350 by Juan Jose Tolentino RN  Outcome: Completed  5/26/2020 0157 by Saira Mitchell RN  Outcome: Ongoing  Goal: Absence of physical injury  Description: Absence of physical injury  5/26/2020 1350 by Juan Jose Tolentino RN  Outcome: Completed  5/26/2020 0157 by Saira Mitchell RN  Outcome: Ongoing

## 2020-05-27 ENCOUNTER — CARE COORDINATION (OUTPATIENT)
Dept: CASE MANAGEMENT | Age: 75
End: 2020-05-27

## 2020-05-27 NOTE — CARE COORDINATION
from the [facility] to follow up to confirm virtual appointment. Informed of request for [facility] order. CTN contact information provided for any questions. No future appointments.     Zoya Holley RN

## 2020-05-29 ENCOUNTER — CARE COORDINATION (OUTPATIENT)
Dept: CASE MANAGEMENT | Age: 75
End: 2020-05-29

## 2020-05-29 NOTE — CARE COORDINATION
[facility] Transitions Follow Up Call    2020    Patient: Marge Owens  Patient : 1945   MRN: [id number]  Reason for Admission: [facility] Resp Failure, PNA, UTI  Discharge Date: 20 RARS: Readmission Risk Score: 9    Care Transitions Subsequent and Final Call    Schedule Follow Up Appointment with PCP:  Declined  Subsequent and Final Calls  Do you have any ongoing symptoms?:  Yes  Onset of Patient-reported symptoms: In the past 7 days  Patient-reported symptoms:  Cough, Shortness of Breath, Weakness  Interventions for patient-reported symptoms:  Notified Home Care, Other  Have your medications changed?:  No  Do you have any questions related to your medications?:  No  Do you currently have any active services?:  Yes  Are you currently active with any services?:  Home Health  Do you have any needs or concerns that I can assist you with?:  No  Identified Barriers: Other  Care Transitions Interventions  Other Interventions:          COVID19 RISK MONITORING  COVID19 SCREEN: 2020 Not Detected  PATIENT RISK FACTORS: Bladder Cancer, Age    Patient contacted regarding COVID-19 risk and screening. Care Transition Nurse spoke w/ Patient by telephone to perform follow-up assessment. Verified name and  as identifiers. Discussed Covid19 related testing which was negative as of 2020; Patient verbalizes understanding. Symptoms reviewed with Patient who verbalized the following symptoms: occasional moist cough w/ clear secretions, generalized weakness at baseline. Denies sob, fever, n/v, malaise, acute distress or other Covid19 related sx. Reports she was contacted by PCP who told her \"it will take a while to get better\", no new rx. Patient continues on Cipro as directed, stressed importance of completing entire course even is asymptomatic; verbalizes understanding. Denies falls. Using walker for ambulation w/ assistance from Dtr.  Patient unsure if [facility] has contacted for intake visit (Dtr not home); CTN to follow up w/ agency. Denies any other resource needs or concerns. Discussed benefits of Telehealth, Flu Clinic, Urgent Care     Education provided regarding infection prevention, and signs and symptoms of COVID-19 and when to seek medical attention with Patient who verbalized understanding. Discussed exposure protocols and quarantine from [address] you at higher risk for severe illness 2019 and given an opportunity for questions and concerns. Patient agrees to contact PCP  for questions related to their healthcare. From CDC: Are you at higher risk for severe illness?  Wash your hands often.  Avoid close contact (6 feet, which is about two arm lengths) with people who are sick.  Put distance between yourself and other people if COVID-19 is spreading in your community.  Clean and disinfect frequently touched surfaces.  Avoid all cruise travel and non-essential air travel.  Call your healthcare professional if you have concerns about COVID-19 and your underlying condition or if you are sick. For more information on steps you can take to protect yourself, see CDC's How to [address] [facility]. On hold x 20 minutes no answer, left voicemail requesting call back to confirm services. Awaiting call back. Plan for f/u in 3-5 days based on severity of symptoms and risk factors. Follow Up  No future appointments.     Cammy Clark RN

## 2020-06-03 ENCOUNTER — CARE COORDINATION (OUTPATIENT)
Dept: CASE MANAGEMENT | Age: 75
End: 2020-06-03

## 2020-06-04 ENCOUNTER — CARE COORDINATION (OUTPATIENT)
Dept: CASE MANAGEMENT | Age: 75
End: 2020-06-04

## 2020-06-10 ENCOUNTER — CARE COORDINATION (OUTPATIENT)
Dept: CASE MANAGEMENT | Age: 75
End: 2020-06-10

## 2020-06-10 NOTE — CARE COORDINATION
[facility] Transitions Follow Up Call    6/10/2020    Patient: Lesa Plascencia  Patient : 1945   MRN: [id number]  Reason for Admission:   SOB  Discharge Date: 20 RARS: Readmission Risk Score: 9         Spoke with:   Patient/ patient's daughter    Care Transitions Subsequent and Final Call    Subsequent and Final Calls  Have your medications changed?:  No  Do you have any questions related to your medications?:  No  Do you currently have any active services?:  Yes  Are you currently active with any services?:  Home Health  Do you have any needs or concerns that I can assist you with?:  No  Identified Barriers:  None  Care Transitions Interventions  Other Interventions: Follow Up:   Patient contacted regarding recent discharge and COVID-19 risk. COVID-19 negative     Care Transition Nurse contacted the patient by telephone to perform post discharge assessment. Verified name and  with patient as identifiers. Patient has following risk factors of: PNA/ Acute Resp. Failure. CTN reviewed medical action plan and red flags related to discharge diagnosis. Reviewed and educated them on any new and changed medications related to discharge diagnosis. Advised obtaining a 90-day supply of all daily and as-needed medications. Patient reports that she worked with PT from [facility] today. Reports that she is not feeling well today. Patient reports that she is shaky, weak and SOB on exertion. Denies fever, chills, N/V/D or worsening symptoms. Discussed safety, fall prevention measures. Instructed on energy conversation, rest, encouraged patient to drink plenty of fluids. Instructed on pursed lip breathing, fan to circulate air. Offered to contact Provider; patient declined. Patient is agreeable to CTN contacting patient 's daughter and [facility] to request follow up. Confirmed contact information for [facility]. Encouraged patient to call if needs arise.      Education

## 2020-06-17 ENCOUNTER — CARE COORDINATION (OUTPATIENT)
Dept: CASE MANAGEMENT | Age: 75
End: 2020-06-17

## 2020-09-08 ENCOUNTER — HOSPITAL ENCOUNTER (OUTPATIENT)
Age: 75
Discharge: HOME | End: 2020-09-08
Payer: MEDICARE

## 2020-09-08 ENCOUNTER — HOSPITAL ENCOUNTER (OUTPATIENT)
Dept: CT IMAGING | Age: 75
Discharge: HOME | End: 2020-09-08
Payer: MEDICARE

## 2020-09-08 ENCOUNTER — HOSPITAL ENCOUNTER (OUTPATIENT)
Dept: GENERAL RADIOLOGY | Age: 75
Discharge: HOME | End: 2020-09-08
Payer: MEDICARE

## 2020-09-08 LAB
BASOPHILS ABSOLUTE: 0 K/CU MM
BASOPHILS RELATIVE PERCENT: 0.1 % (ref 0–1)
DIFFERENTIAL TYPE: ABNORMAL
EOSINOPHILS ABSOLUTE: 0 K/CU MM
EOSINOPHILS RELATIVE PERCENT: 0 % (ref 0–3)
GFR AFRICAN AMERICAN: >60 ML/MIN/1.73M2
GFR NON-AFRICAN AMERICAN: >60 ML/MIN/1.73M2
HCT VFR BLD CALC: 53.5 % (ref 37–47)
HEMOGLOBIN: 17.1 GM/DL (ref 12.5–16)
IMMATURE NEUTROPHIL %: 0.3 % (ref 0–0.43)
LYMPHOCYTES ABSOLUTE: 1 K/CU MM
LYMPHOCYTES RELATIVE PERCENT: 13.8 % (ref 24–44)
MCH RBC QN AUTO: 35.6 PG (ref 27–31)
MCHC RBC AUTO-ENTMCNC: 32 % (ref 32–36)
MCV RBC AUTO: 111.5 FL (ref 78–100)
MONOCYTES ABSOLUTE: 0.1 K/CU MM
MONOCYTES RELATIVE PERCENT: 1 % (ref 0–4)
NUCLEATED RBC %: 0 %
PDW BLD-RTO: 12.2 % (ref 11.7–14.9)
PLATELET # BLD: 258 K/CU MM (ref 140–440)
PMV BLD AUTO: 8.3 FL (ref 7.5–11.1)
POC CREATININE: 0.9 MG/DL (ref 0.6–1.1)
RBC # BLD: 4.8 M/CU MM (ref 4.2–5.4)
REASON FOR REJECTION: NORMAL
REJECTED TEST: NORMAL
SEGMENTED NEUTROPHILS ABSOLUTE COUNT: 6.2 K/CU MM
SEGMENTED NEUTROPHILS RELATIVE PERCENT: 84.8 % (ref 36–66)
TOTAL IMMATURE NEUTOROPHIL: 0.02 K/CU MM
TOTAL NUCLEATED RBC: 0 K/CU MM
TSH HIGH SENSITIVITY: 0.69 UIU/ML (ref 0.27–4.2)
WBC # BLD: 7.3 K/CU MM (ref 4–10.5)

## 2020-09-08 PROCEDURE — 80053 COMPREHEN METABOLIC PANEL: CPT

## 2020-09-08 PROCEDURE — 72100 X-RAY EXAM L-S SPINE 2/3 VWS: CPT

## 2020-09-08 PROCEDURE — 74177 CT ABD & PELVIS W/CONTRAST: CPT

## 2020-09-08 PROCEDURE — 6360000004 HC RX CONTRAST MEDICATION: Performed by: FAMILY MEDICINE

## 2020-09-08 PROCEDURE — 36415 COLL VENOUS BLD VENIPUNCTURE: CPT

## 2020-09-08 PROCEDURE — 85025 COMPLETE CBC W/AUTO DIFF WBC: CPT

## 2020-09-08 PROCEDURE — 84443 ASSAY THYROID STIM HORMONE: CPT

## 2020-09-08 RX ADMIN — IOPAMIDOL 80 ML: 755 INJECTION, SOLUTION INTRAVENOUS at 13:27

## 2020-09-08 RX ADMIN — IOHEXOL 50 ML: 240 INJECTION, SOLUTION INTRATHECAL; INTRAVASCULAR; INTRAVENOUS; ORAL at 13:27

## 2023-07-14 ENCOUNTER — HOSPITAL ENCOUNTER (OUTPATIENT)
Dept: SLEEP CENTER | Age: 78
Discharge: HOME | End: 2023-07-14
Payer: MEDICARE

## 2023-07-14 VITALS
WEIGHT: 127 LBS | SYSTOLIC BLOOD PRESSURE: 132 MMHG | HEIGHT: 60 IN | BODY MASS INDEX: 24.94 KG/M2 | OXYGEN SATURATION: 93 % | DIASTOLIC BLOOD PRESSURE: 62 MMHG | HEART RATE: 82 BPM

## 2023-07-14 DIAGNOSIS — R06.83 SNORING: ICD-10-CM

## 2023-07-14 DIAGNOSIS — G47.10 HYPERSOMNOLENCE: Primary | ICD-10-CM

## 2023-07-14 PROCEDURE — 99211 OFF/OP EST MAY X REQ PHY/QHP: CPT

## 2023-07-14 RX ORDER — BISACODYL 5 MG/1
5 TABLET, DELAYED RELEASE ORAL DAILY PRN
COMMUNITY

## 2023-07-14 ASSESSMENT — SLEEP AND FATIGUE QUESTIONNAIRES
HOW LIKELY ARE YOU TO NOD OFF OR FALL ASLEEP WHILE SITTING QUIETLY AFTER LUNCH WITHOUT ALCOHOL: 2
HOW LIKELY ARE YOU TO NOD OFF OR FALL ASLEEP WHILE SITTING INACTIVE IN A PUBLIC PLACE: 0
HOW LIKELY ARE YOU TO NOD OFF OR FALL ASLEEP WHILE LYING DOWN TO REST IN THE AFTERNOON WHEN CIRCUMSTANCES PERMIT: 2
HOW LIKELY ARE YOU TO NOD OFF OR FALL ASLEEP WHILE SITTING AND TALKING TO SOMEONE: 0
NECK CIRCUMFERENCE (INCHES): 13.25
HOW LIKELY ARE YOU TO NOD OFF OR FALL ASLEEP WHEN YOU ARE A PASSENGER IN A CAR FOR AN HOUR WITHOUT A BREAK: 1
HOW LIKELY ARE YOU TO NOD OFF OR FALL ASLEEP IN A CAR, WHILE STOPPED FOR A FEW MINUTES IN TRAFFIC: 0
HOW LIKELY ARE YOU TO NOD OFF OR FALL ASLEEP WHILE WATCHING TV: 1
ESS TOTAL SCORE: 7
HOW LIKELY ARE YOU TO NOD OFF OR FALL ASLEEP WHILE SITTING AND READING: 1

## 2023-07-14 NOTE — PROGRESS NOTES
Neva Mcqueen MD, Itzel Hollingsworth MD, Angel[facility] Palomo MD, [facility]      [address] [facility], [address] [address] [place]: [phone]  F: [phone]     Subjective:     Patient ID: Rafiq Lvei is a 65 y.o. female, referred to the sleep center for consultation with a sleep specialist.     Reason for Consultation/Chief Complaint:   Chief Complaint   Patient presents with    Snoring       Referring physician:   Milana CAMACHO    Symptoms:   [x]  Snoring                                                                [x]  Dry Mouth  []  Choking                                                               []  Morning Headaches  []  Gasping for Air                                                    []  Trouble Falling asleep  [x]  Tired during the daytime                                     []  Trouble Staying Asleep  [x]  Tired when you wake up                                     []  Weight Gain in Last 5 Years  []  Wake up frequently at night                                []  Weight Loss in Last 5 Years  [x]  Shortness Of Breath                                            []  Shift Worker  []  Coughing                                                             [x]  Smoker (Previous or Current)  []  Chest Pain                                                           []  Anxiety  []  Trouble keeping your legs still at night                [x]  Depression  []  Kicking your legs in your sleep                            []  Insomnia            []  Other:     Significant Co-morbidities:  []  Congestive Heart Failure     [x]  COPD         []  Stroke (Past 30 Days)      []  Supplemental Oxygen Usage       []  Cognitive Impairment      []  Neuromuscular Problems  []  Epilepsy/Neurological Disorders         Duration of Sleep Problems:    History:    Social History     Socioeconomic History    Marital status:

## 2023-09-06 ENCOUNTER — HOSPITAL ENCOUNTER (OUTPATIENT)
Dept: SLEEP CENTER | Age: 78
Discharge: HOME | End: 2023-09-06
Payer: MEDICARE

## 2023-09-06 DIAGNOSIS — R06.83 SNORING: ICD-10-CM

## 2023-09-06 DIAGNOSIS — G47.10 HYPERSOMNOLENCE: ICD-10-CM

## 2023-09-06 PROCEDURE — 95811 POLYSOM 6/>YRS CPAP 4/> PARM: CPT

## 2023-09-22 ENCOUNTER — HOSPITAL ENCOUNTER (OUTPATIENT)
Dept: SLEEP CENTER | Age: 78
Discharge: HOME | End: 2023-09-22

## 2023-09-22 DIAGNOSIS — G47.31 COMPLEX SLEEP APNEA SYNDROME: Primary | ICD-10-CM

## 2023-09-22 PROBLEM — G47.39 COMPLEX SLEEP APNEA SYNDROME: Status: ACTIVE | Noted: 2023-09-22

## 2023-09-22 PROCEDURE — 9990000010 HC NO CHARGE VISIT

## 2023-09-22 NOTE — PROGRESS NOTES
Germaine LLANOS, Kari Chen MD, [address] [facility] MD, [address] DO      [address] [facility], [address] [facility]   [address] [place]: [phone]  F: [phone]     Subjective:     Patient ID: Diana Weston is a 65 y.o. female, following up today with the sleep center. Reason for Follow Up/Chief Complaint:   Chief Complaint   Patient presents with    Sleep Apnea       Results:Sleep study: Moderate complex sleep apnea. History:     Social History     Socioeconomic History    Marital status:      Spouse name: Not on file    Number of children: Not on file    Years of education: Not on file    Highest education level: Not on file   Occupational History    Not on file   Tobacco Use    Smoking status: Every Day     Packs/day: 1     Types: Cigarettes    Smokeless tobacco: Never   Substance and Sexual Activity    Alcohol use: Never    Drug use: Never    Sexual activity: Not on file   Other Topics Concern    Not on file   Social History Narrative    Not on file     Social Determinants of Health     Financial Resource Strain: Not on file   Food Insecurity: Not on file   Transportation Needs: Not on file   Physical Activity: Not on file   Stress: Not on file   Social Connections: Not on file   Intimate Partner Violence: Not on file   Housing Stability: Not on file       Prior to Admission medications    Medication Sig Start Date End Date Taking? Authorizing Provider   bisacodyl (DULCOLAX) 5 MG EC tablet Take 1 tablet by mouth daily as needed for Constipation   Yes Historical Provider, MD   oxyCODONE (ROXICODONE) 5 MG immediate release tablet Take 1 tablet by mouth every 6 hours as needed for Pain. Yes Historical Provider, MD   gabapentin (NEURONTIN) 400 MG capsule Take 2 capsules by mouth 3 times daily.    Yes Historical Provider, MD   escitalopram (LEXAPRO) 5 MG tablet Take 1 tablet by mouth nightly   Yes

## 2023-11-07 ENCOUNTER — HOSPITAL ENCOUNTER (OUTPATIENT)
Dept: SLEEP CENTER | Age: 78
Discharge: HOME | End: 2023-11-07
Payer: MEDICARE

## 2023-11-07 DIAGNOSIS — G47.31 COMPLEX SLEEP APNEA SYNDROME: ICD-10-CM

## 2023-11-07 PROCEDURE — 95811 POLYSOM 6/>YRS CPAP 4/> PARM: CPT

## 2023-11-08 NOTE — PROGRESS NOTES
11/7/2023  sleep study  for Jo Ann Covington  1945 is complete. Results are pending physician review.     Electronically signed by [employer] on 11/7/2023 at 8:36 PM

## 2024-02-13 ENCOUNTER — TELEPHONE (OUTPATIENT)
Dept: GASTROENTEROLOGY | Age: 79
End: 2024-02-13

## 2024-02-13 NOTE — TELEPHONE ENCOUNTER
Spoke w/ pt daughter dileep regarding referral for change in stools. Made appt for pt to see dr. Rothman on 3/13/24 @2:15am

## 2024-02-23 ENCOUNTER — HOSPITAL ENCOUNTER (OUTPATIENT)
Dept: SLEEP CENTER | Age: 79
Discharge: HOME | End: 2024-02-23
Payer: MEDICARE

## 2024-02-23 PROCEDURE — 99211 OFF/OP EST MAY X REQ PHY/QHP: CPT

## 2024-02-23 ASSESSMENT — SLEEP AND FATIGUE QUESTIONNAIRES
HOW LIKELY ARE YOU TO NOD OFF OR FALL ASLEEP WHILE SITTING INACTIVE IN A PUBLIC PLACE: 1
HOW LIKELY ARE YOU TO NOD OFF OR FALL ASLEEP WHILE SITTING AND READING: 3
HOW LIKELY ARE YOU TO NOD OFF OR FALL ASLEEP WHILE LYING DOWN TO REST IN THE AFTERNOON WHEN CIRCUMSTANCES PERMIT: 1
HOW LIKELY ARE YOU TO NOD OFF OR FALL ASLEEP WHILE SITTING AND TALKING TO SOMEONE: 0
ESS TOTAL SCORE: 9
HOW LIKELY ARE YOU TO NOD OFF OR FALL ASLEEP WHILE WATCHING TV: 3
HOW LIKELY ARE YOU TO NOD OFF OR FALL ASLEEP WHILE SITTING QUIETLY AFTER LUNCH WITHOUT ALCOHOL: 1
HOW LIKELY ARE YOU TO NOD OFF OR FALL ASLEEP WHEN YOU ARE A PASSENGER IN A CAR FOR AN HOUR WITHOUT A BREAK: 0
HOW LIKELY ARE YOU TO NOD OFF OR FALL ASLEEP IN A CAR, WHILE STOPPED FOR A FEW MINUTES IN TRAFFIC: 0

## 2024-02-23 NOTE — PROGRESS NOTES
[facility]      Vega Mckeon MD, FACP, [facility]  Teja Whitfield MD, [facility]  Alonzo Holcomb MD, [facility]  Bhargavi Cook DO  Soo Quevedo DO      [address]  [address]  [place] [address]   PH: [phone]  F: [phone]     Subjective:     Patient ID: Lela Lima is a 78 y.o. female, following up today with the sleep center.     Reason for Follow Up/Chief Complaint:   Chief Complaint   Patient presents with    Sleep Apnea       Results:Sleep study:Moderate complex sleep apnea.    History: Using ASV.Sleeping well.Fresh in am.No EDS.No snoring.Download reviewed.AHI 3.5.    Social History     Socioeconomic History    Marital status:      Spouse name: Not on file    Number of children: Not on file    Years of education: Not on file    Highest education level: Not on file   Occupational History    Not on file   Tobacco Use    Smoking status: Every Day     Current packs/day: 1.00     Types: Cigarettes    Smokeless tobacco: Never   Substance and Sexual Activity    Alcohol use: Never    Drug use: Never    Sexual activity: Not on file   Other Topics Concern    Not on file   Social History Narrative    Not on file     Social Determinants of Health     Financial Resource Strain: Not on file   Food Insecurity: Not on file   Transportation Needs: Not on file   Physical Activity: Not on file   Stress: Not on file   Social Connections: Not on file   Intimate Partner Violence: Not on file   Housing Stability: Not on file       Prior to Admission medications    Medication Sig Start Date End Date Taking? Authorizing Provider   bisacodyl (DULCOLAX) 5 MG EC tablet Take 1 tablet by mouth daily as needed for Constipation   Yes Provider, MD Sunil   oxyCODONE (ROXICODONE) 5 MG immediate release tablet Take 1 tablet by mouth every 6 hours as needed for Pain.   Yes ProviderSunil MD   ibuprofen (ADVIL;MOTRIN) 800 MG tablet Take 1 tablet by mouth every 8 hours as needed for

## 2024-05-17 ENCOUNTER — TELEPHONE (OUTPATIENT)
Dept: GASTROENTEROLOGY | Age: 79
End: 2024-05-17

## 2024-05-17 NOTE — TELEPHONE ENCOUNTER
Spoke with pt daughter dileep regarding referral for a colon screening. Made appt for pt to see isac on 6/25/24 @3:30pm

## 2024-06-28 ENCOUNTER — OFFICE VISIT (OUTPATIENT)
Dept: GASTROENTEROLOGY | Age: 79
End: 2024-06-28
Payer: MEDICARE

## 2024-06-28 ENCOUNTER — PREP FOR PROCEDURE (OUTPATIENT)
Dept: GASTROENTEROLOGY | Age: 79
End: 2024-06-28

## 2024-06-28 VITALS
SYSTOLIC BLOOD PRESSURE: 130 MMHG | HEIGHT: 59 IN | HEART RATE: 78 BPM | DIASTOLIC BLOOD PRESSURE: 70 MMHG | OXYGEN SATURATION: 98 % | BODY MASS INDEX: 25.48 KG/M2 | WEIGHT: 126.4 LBS

## 2024-06-28 DIAGNOSIS — R11.0 NAUSEA: ICD-10-CM

## 2024-06-28 DIAGNOSIS — R19.4 ALTERED BOWEL HABITS: ICD-10-CM

## 2024-06-28 DIAGNOSIS — K59.03 DRUG-INDUCED CONSTIPATION: ICD-10-CM

## 2024-06-28 DIAGNOSIS — Z80.0 FAMILY HISTORY OF COLON CANCER: ICD-10-CM

## 2024-06-28 DIAGNOSIS — Z86.010 HX OF COLONIC POLYPS: ICD-10-CM

## 2024-06-28 DIAGNOSIS — R13.19 ESOPHAGEAL DYSPHAGIA: ICD-10-CM

## 2024-06-28 DIAGNOSIS — Z86.010 HISTORY OF COLON POLYPS: ICD-10-CM

## 2024-06-28 DIAGNOSIS — K21.9 GASTROESOPHAGEAL REFLUX DISEASE, UNSPECIFIED WHETHER ESOPHAGITIS PRESENT: ICD-10-CM

## 2024-06-28 DIAGNOSIS — R19.4 ALTERED BOWEL HABITS: Primary | ICD-10-CM

## 2024-06-28 DIAGNOSIS — K59.03 DRUG INDUCED CONSTIPATION: ICD-10-CM

## 2024-06-28 PROBLEM — Z86.0100 HX OF COLONIC POLYPS: Status: ACTIVE | Noted: 2024-06-28

## 2024-06-28 PROCEDURE — G8399 PT W/DXA RESULTS DOCUMENT: HCPCS | Performed by: NURSE PRACTITIONER

## 2024-06-28 PROCEDURE — 1123F ACP DISCUSS/DSCN MKR DOCD: CPT | Performed by: NURSE PRACTITIONER

## 2024-06-28 PROCEDURE — 99204 OFFICE O/P NEW MOD 45 MIN: CPT | Performed by: NURSE PRACTITIONER

## 2024-06-28 PROCEDURE — G8419 CALC BMI OUT NRM PARAM NOF/U: HCPCS | Performed by: NURSE PRACTITIONER

## 2024-06-28 PROCEDURE — G8427 DOCREV CUR MEDS BY ELIG CLIN: HCPCS | Performed by: NURSE PRACTITIONER

## 2024-06-28 PROCEDURE — 4004F PT TOBACCO SCREEN RCVD TLK: CPT | Performed by: NURSE PRACTITIONER

## 2024-06-28 PROCEDURE — 1090F PRES/ABSN URINE INCON ASSESS: CPT | Performed by: NURSE PRACTITIONER

## 2024-06-28 RX ORDER — POLYETHYLENE GLYCOL 3350, SODIUM SULFATE ANHYDROUS, SODIUM BICARBONATE, SODIUM CHLORIDE, POTASSIUM CHLORIDE 236; 22.74; 6.74; 5.86; 2.97 G/4L; G/4L; G/4L; G/4L; G/4L
4 POWDER, FOR SOLUTION ORAL ONCE
Qty: 4000 ML | Refills: 0 | Status: SHIPPED | OUTPATIENT
Start: 2024-06-28 | End: 2024-06-28

## 2024-06-28 RX ORDER — SODIUM CHLORIDE, SODIUM LACTATE, POTASSIUM CHLORIDE, CALCIUM CHLORIDE 600; 310; 30; 20 MG/100ML; MG/100ML; MG/100ML; MG/100ML
INJECTION, SOLUTION INTRAVENOUS CONTINUOUS
Status: CANCELLED | OUTPATIENT
Start: 2024-06-28

## 2024-06-28 RX ORDER — SODIUM CHLORIDE 9 MG/ML
25 INJECTION, SOLUTION INTRAVENOUS PRN
Status: CANCELLED | OUTPATIENT
Start: 2024-06-28

## 2024-06-28 RX ORDER — SODIUM CHLORIDE 0.9 % (FLUSH) 0.9 %
5-40 SYRINGE (ML) INJECTION EVERY 12 HOURS SCHEDULED
Status: CANCELLED | OUTPATIENT
Start: 2024-06-28

## 2024-06-28 RX ORDER — SODIUM CHLORIDE 0.9 % (FLUSH) 0.9 %
5-40 SYRINGE (ML) INJECTION PRN
Status: CANCELLED | OUTPATIENT
Start: 2024-06-28

## 2024-06-28 RX ORDER — POLYETHYLENE GLYCOL 3350 17 G/17G
17 POWDER, FOR SOLUTION ORAL 2 TIMES DAILY
Qty: 527 G | Refills: 3 | Status: SHIPPED | OUTPATIENT
Start: 2024-06-28 | End: 2024-07-28

## 2024-06-28 NOTE — PROGRESS NOTES
Lela Lima 78 y.o. female was seen by DEANDRA Flores on 6/28/2024     Wt Readings from Last 3 Encounters:   06/28/24 57.3 kg (126 lb 6.4 oz)   07/14/23 57.6 kg (127 lb)   05/25/20 64 kg (141 lb)       HPI  Lela Lima is a pleasant 78 y.o.  female who presents today with daughter Bentley for acid reflux, altered bowel habits, constipation, dysphagia, history of colon polyps and nausea.  She has a past medical history of anxiety, back pain, bladder cancer, cardiac abnormality, and falls.  She had urothelial carcinoma in 2017 with status post urostomy.  She is on Oxcodone for chronic pain.  Her last colonoscopy was done pr patient record multiple years ago in [facility] with colon polyps removed.  She smokes two cigarettes per day.  Her bowel pattern changed to constipation in the last year.  Her bowels move every two to three days with hard small brown stools.  Stool softeners and liquid laxatives help some.  No diarrhea.  No blood in her stools or melena.  She has excess belching and flatulence.  Her appetite is good without early sateity.  Her weight is stable.  Nausea three to four times a week.  No vomiting.  No abdominal pain, bloating or distention.  Her GERD has been ongoing for over ten years.  Her heartburn and acid reflux is controlled with Prilosec.  No nocturnal awakenings with acid reflux.  Intermittent dysphagia with food getting stuck last episode the day before yesterday pills stuck in her upper esophagus.  No problems with liquids.  No pain with swallowing.  No family history of stomach cancer.  Her brother had colon cancer at 61.    ROS  Review of Systems   Constitutional:  Positive for fatigue. Negative for appetite change, chills, diaphoresis, fever and unexpected weight change.   HENT:  Positive for hearing loss. Negative for ear pain and tinnitus.    Eyes:  Positive for visual disturbance. Negative for photophobia and pain.        Macular degeneration   Respiratory:

## 2024-07-18 ENCOUNTER — HOSPITAL ENCOUNTER (EMERGENCY)
Age: 79
Discharge: HOME | End: 2024-07-19
Attending: EMERGENCY MEDICINE
Payer: MEDICARE

## 2024-07-18 ENCOUNTER — APPOINTMENT (OUTPATIENT)
Dept: GENERAL RADIOLOGY | Age: 79
End: 2024-07-18
Payer: MEDICARE

## 2024-07-18 DIAGNOSIS — N39.0 URINARY TRACT INFECTION WITH HEMATURIA, SITE UNSPECIFIED: Primary | ICD-10-CM

## 2024-07-18 DIAGNOSIS — R31.9 URINARY TRACT INFECTION WITH HEMATURIA, SITE UNSPECIFIED: Primary | ICD-10-CM

## 2024-07-18 LAB
BACTERIA: NEGATIVE /HPF
BASOPHILS ABSOLUTE: 0 K/CU MM
BASOPHILS RELATIVE PERCENT: 0.3 % (ref 0–1)
BILIRUBIN, URINE: ABNORMAL MG/DL
BLOOD, URINE: ABNORMAL
CLARITY, UA: CLEAR
COLOR, UA: ABNORMAL
DIFFERENTIAL TYPE: ABNORMAL
EOSINOPHILS ABSOLUTE: 0 K/CU MM
EOSINOPHILS RELATIVE PERCENT: 0.3 % (ref 0–3)
GLUCOSE URINE: NEGATIVE MG/DL
HCT VFR BLD CALC: 39.6 % (ref 37–47)
HEMOGLOBIN: 13.4 GM/DL (ref 12.5–16)
IMMATURE NEUTROPHIL %: 0.3 % (ref 0–0.43)
KETONES, URINE: NEGATIVE MG/DL
LEUKOCYTE ESTERASE, URINE: ABNORMAL
LYMPHOCYTES ABSOLUTE: 0.9 K/CU MM
LYMPHOCYTES RELATIVE PERCENT: 11.7 % (ref 24–44)
MCH RBC QN AUTO: 34 PG (ref 27–31)
MCHC RBC AUTO-ENTMCNC: 33.8 % (ref 32–36)
MCV RBC AUTO: 100.5 FL (ref 78–100)
MONOCYTES ABSOLUTE: 0.6 K/CU MM
MONOCYTES RELATIVE PERCENT: 7.8 % (ref 0–4)
MUCUS: ABNORMAL HPF
NEUTROPHILS ABSOLUTE: 6.2 K/CU MM
NEUTROPHILS RELATIVE PERCENT: 79.6 % (ref 36–66)
NITRITE URINE, QUANTITATIVE: NEGATIVE
NUCLEATED RBC %: 0 %
PDW BLD-RTO: 12.6 % (ref 11.7–14.9)
PH, URINE: 7 (ref 5–8)
PLATELET # BLD: 161 K/CU MM (ref 140–440)
PMV BLD AUTO: 9 FL (ref 7.5–11.1)
PROTEIN UA: >300 MG/DL
RBC # BLD: 3.94 M/CU MM (ref 4.2–5.4)
RBC URINE: 417 /HPF (ref 0–6)
REASON FOR REJECTION: NORMAL
REJECTED TEST: NORMAL
SPECIFIC GRAVITY UA: 1.01 (ref 1–1.03)
TOTAL IMMATURE NEUTOROPHIL: 0.02 K/CU MM
TOTAL NUCLEATED RBC: 0 K/CU MM
TRICHOMONAS: ABNORMAL /HPF
UROBILINOGEN, URINE: 1 MG/DL (ref 0.2–1)
WBC # BLD: 7.8 K/CU MM (ref 4–10.5)
WBC UA: 91 /HPF (ref 0–5)

## 2024-07-18 PROCEDURE — 0202U NFCT DS 22 TRGT SARS-COV-2: CPT

## 2024-07-18 PROCEDURE — 87086 URINE CULTURE/COLONY COUNT: CPT

## 2024-07-18 PROCEDURE — 84484 ASSAY OF TROPONIN QUANT: CPT

## 2024-07-18 PROCEDURE — 85025 COMPLETE CBC W/AUTO DIFF WBC: CPT

## 2024-07-18 PROCEDURE — 83605 ASSAY OF LACTIC ACID: CPT

## 2024-07-18 PROCEDURE — 81001 URINALYSIS AUTO W/SCOPE: CPT

## 2024-07-18 PROCEDURE — 71045 X-RAY EXAM CHEST 1 VIEW: CPT

## 2024-07-18 PROCEDURE — 84145 PROCALCITONIN (PCT): CPT

## 2024-07-18 PROCEDURE — 80053 COMPREHEN METABOLIC PANEL: CPT

## 2024-07-18 PROCEDURE — 93005 ELECTROCARDIOGRAM TRACING: CPT | Performed by: EMERGENCY MEDICINE

## 2024-07-18 PROCEDURE — 87040 BLOOD CULTURE FOR BACTERIA: CPT

## 2024-07-18 PROCEDURE — 99285 EMERGENCY DEPT VISIT HI MDM: CPT

## 2024-07-18 ASSESSMENT — LIFESTYLE VARIABLES
HOW OFTEN DO YOU HAVE A DRINK CONTAINING ALCOHOL: NEVER
HOW MANY STANDARD DRINKS CONTAINING ALCOHOL DO YOU HAVE ON A TYPICAL DAY: PATIENT DOES NOT DRINK

## 2024-07-18 ASSESSMENT — PAIN SCALES - GENERAL: PAINLEVEL_OUTOF10: 2

## 2024-07-19 VITALS
HEART RATE: 73 BPM | TEMPERATURE: 100 F | RESPIRATION RATE: 19 BRPM | WEIGHT: 126 LBS | SYSTOLIC BLOOD PRESSURE: 121 MMHG | OXYGEN SATURATION: 94 % | DIASTOLIC BLOOD PRESSURE: 52 MMHG | BODY MASS INDEX: 25.4 KG/M2 | HEIGHT: 59 IN

## 2024-07-19 LAB
ALBUMIN SERPL-MCNC: 3.4 GM/DL (ref 3.4–5)
ALP BLD-CCNC: 82 IU/L (ref 40–128)
ALT SERPL-CCNC: 15 U/L (ref 10–40)
ANION GAP SERPL CALCULATED.3IONS-SCNC: 13 MMOL/L (ref 7–16)
AST SERPL-CCNC: 21 IU/L (ref 15–37)
B PARAP IS1001 DNA NPH QL NAA+NON-PROBE: NOT DETECTED
B PERT.PT PRMT NPH QL NAA+NON-PROBE: NOT DETECTED
BILIRUB SERPL-MCNC: 0.2 MG/DL (ref 0–1)
BUN SERPL-MCNC: 22 MG/DL (ref 6–23)
C PNEUM DNA NPH QL NAA+NON-PROBE: NOT DETECTED
CALCIUM SERPL-MCNC: 9.2 MG/DL (ref 8.3–10.6)
CHLORIDE BLD-SCNC: 99 MMOL/L (ref 99–110)
CO2: 20 MMOL/L (ref 21–32)
CREAT SERPL-MCNC: 0.9 MG/DL (ref 0.6–1.1)
CULTURE: NORMAL
EKG ATRIAL RATE: 82 BPM
EKG DIAGNOSIS: NORMAL
EKG P AXIS: 54 DEGREES
EKG P-R INTERVAL: 126 MS
EKG Q-T INTERVAL: 380 MS
EKG QRS DURATION: 114 MS
EKG QTC CALCULATION (BAZETT): 443 MS
EKG R AXIS: -21 DEGREES
EKG T AXIS: 3 DEGREES
EKG VENTRICULAR RATE: 82 BPM
FLUAV H1 2009 PAN RNA NPH NAA+NON-PROBE: NOT DETECTED
FLUAV H1 RNA NPH QL NAA+NON-PROBE: NOT DETECTED
FLUAV H3 RNA NPH QL NAA+NON-PROBE: NOT DETECTED
FLUAV RNA NPH QL NAA+NON-PROBE: NOT DETECTED
FLUBV RNA NPH QL NAA+NON-PROBE: NOT DETECTED
GFR, ESTIMATED: 65 ML/MIN/1.73M2
GLUCOSE SERPL-MCNC: 126 MG/DL (ref 70–99)
HADV DNA NPH QL NAA+NON-PROBE: NOT DETECTED
HCOV 229E RNA NPH QL NAA+NON-PROBE: NOT DETECTED
HCOV HKU1 RNA NPH QL NAA+NON-PROBE: NOT DETECTED
HCOV NL63 RNA NPH QL NAA+NON-PROBE: NOT DETECTED
HCOV OC43 RNA NPH QL NAA+NON-PROBE: NOT DETECTED
HMPV RNA NPH QL NAA+NON-PROBE: NOT DETECTED
HPIV1 RNA NPH QL NAA+NON-PROBE: NOT DETECTED
HPIV2 RNA NPH QL NAA+NON-PROBE: NOT DETECTED
HPIV3 RNA NPH QL NAA+NON-PROBE: NOT DETECTED
HPIV4 RNA NPH QL NAA+NON-PROBE: NOT DETECTED
LACTIC ACID, SEPSIS: 1.3 MMOL/L (ref 0.4–2)
Lab: NORMAL
M PNEUMO DNA NPH QL NAA+NON-PROBE: NOT DETECTED
POTASSIUM SERPL-SCNC: 4.1 MMOL/L (ref 3.5–5.1)
PROCALCITONIN SERPL-MCNC: 0.22 NG/ML
RSV RNA NPH QL NAA+NON-PROBE: NOT DETECTED
RV+EV RNA NPH QL NAA+NON-PROBE: NOT DETECTED
SARS-COV-2 RNA NPH QL NAA+NON-PROBE: NOT DETECTED
SODIUM BLD-SCNC: 132 MMOL/L (ref 135–145)
SPECIMEN: NORMAL
TOTAL PROTEIN: 6.5 GM/DL (ref 6.4–8.2)
TROPONIN, HIGH SENSITIVITY: 12 NG/L (ref 0–14)

## 2024-07-19 PROCEDURE — 6370000000 HC RX 637 (ALT 250 FOR IP): Performed by: EMERGENCY MEDICINE

## 2024-07-19 PROCEDURE — 93010 ELECTROCARDIOGRAM REPORT: CPT | Performed by: INTERNAL MEDICINE

## 2024-07-19 RX ORDER — CEPHALEXIN 500 MG/1
500 CAPSULE ORAL 3 TIMES DAILY
Qty: 21 CAPSULE | Refills: 0 | Status: ON HOLD | OUTPATIENT
Start: 2024-07-19 | End: 2024-07-24 | Stop reason: HOSPADM

## 2024-07-19 RX ORDER — CEPHALEXIN 250 MG/1
500 CAPSULE ORAL ONCE
Status: COMPLETED | OUTPATIENT
Start: 2024-07-19 | End: 2024-07-19

## 2024-07-19 RX ADMIN — CEPHALEXIN 500 MG: 250 CAPSULE ORAL at 01:45

## 2024-07-19 NOTE — ED PROVIDER NOTES
Triage Chief Complaint:   Fever and Fatigue    [place]:  Lela Lima is a 78 y.o. female that presents with 1 week of general illness.  The patient states that over the past week she has had worsening general fatigue and weakness with intermittent fevers up to 101 °F.  Also states that she has noticed that her urine has been red-colored in her urostomy bag.  She does have history of bladder cancer status post cystectomy and ileal conduit formation.  Denies any chest pain, shortness of breath, cough, vomiting, diarrhea, abdominal pain.    ROS:  At least 10 systems reviewed and otherwise acutely negative except as in the [place].    Past Medical History:   Diagnosis Date    Anxiety     Back pain     Cancer (HCC)     bladder    Cardiac abnormality     Falls      Past Surgical History:   Procedure Laterality Date    BLADDER REMOVAL      FOOT SURGERY      HYSTERECTOMY (CERVIX STATUS UNKNOWN)      INNER EAR SURGERY Right 1983    SINUS SURGERY       No family history on file.  Social History     Socioeconomic History    Marital status:      Spouse name: Not on file    Number of children: Not on file    Years of education: Not on file    Highest education level: Not on file   Occupational History    Not on file   Tobacco Use    Smoking status: Every Day     Current packs/day: 1.00     Types: Cigarettes    Smokeless tobacco: Never   Substance and Sexual Activity    Alcohol use: Never    Drug use: Never    Sexual activity: Not on file   Other Topics Concern    Not on file   Social History Narrative    Not on file     Social Determinants of Health     Financial Resource Strain: Not on file   Food Insecurity: Not on file   Transportation Needs: Not on file   Physical Activity: Not on file   Stress: Not on file   Social Connections: Not on file   Intimate Partner Violence: Not on file   Housing Stability: Not on file     Current Facility-Administered Medications   Medication Dose Route Frequency Provider Last Rate Last

## 2024-07-21 LAB
CULTURE: NORMAL
CULTURE: NORMAL
Lab: NORMAL
Lab: NORMAL
SPECIMEN: NORMAL
SPECIMEN: NORMAL

## 2024-07-22 ENCOUNTER — APPOINTMENT (OUTPATIENT)
Dept: GENERAL RADIOLOGY | Age: 79
DRG: 690 | End: 2024-07-22
Payer: MEDICARE

## 2024-07-22 ENCOUNTER — TELEPHONE (OUTPATIENT)
Dept: PHARMACY | Age: 79
End: 2024-07-22

## 2024-07-22 ENCOUNTER — HOSPITAL ENCOUNTER (INPATIENT)
Age: 79
LOS: 2 days | Discharge: HOME | DRG: 690 | End: 2024-07-24
Attending: EMERGENCY MEDICINE
Payer: MEDICARE

## 2024-07-22 DIAGNOSIS — R78.81 BACTEREMIA: Primary | ICD-10-CM

## 2024-07-22 LAB
ALBUMIN SERPL-MCNC: 4 GM/DL (ref 3.4–5)
ALP BLD-CCNC: 113 IU/L (ref 40–128)
ALT SERPL-CCNC: 15 U/L (ref 10–40)
ANION GAP SERPL CALCULATED.3IONS-SCNC: 15 MMOL/L (ref 7–16)
AST SERPL-CCNC: 17 IU/L (ref 15–37)
BACTERIA: NEGATIVE /HPF
BASOPHILS ABSOLUTE: 0 K/CU MM
BASOPHILS RELATIVE PERCENT: 0.2 % (ref 0–1)
BILIRUB SERPL-MCNC: 0.3 MG/DL (ref 0–1)
BILIRUBIN, URINE: NEGATIVE MG/DL
BLOOD, URINE: ABNORMAL
BUN SERPL-MCNC: 23 MG/DL (ref 6–23)
CALCIUM SERPL-MCNC: 9.6 MG/DL (ref 8.3–10.6)
CHLORIDE BLD-SCNC: 96 MMOL/L (ref 99–110)
CLARITY, UA: CLEAR
CO2: 24 MMOL/L (ref 21–32)
COLOR, UA: YELLOW
CREAT SERPL-MCNC: 1.1 MG/DL (ref 0.6–1.1)
DIFFERENTIAL TYPE: ABNORMAL
EKG ATRIAL RATE: 78 BPM
EKG DIAGNOSIS: NORMAL
EKG P AXIS: 66 DEGREES
EKG P-R INTERVAL: 128 MS
EKG Q-T INTERVAL: 404 MS
EKG QRS DURATION: 100 MS
EKG QTC CALCULATION (BAZETT): 460 MS
EKG R AXIS: -16 DEGREES
EKG T AXIS: -11 DEGREES
EKG VENTRICULAR RATE: 78 BPM
EOSINOPHILS ABSOLUTE: 0.1 K/CU MM
EOSINOPHILS RELATIVE PERCENT: 1.1 % (ref 0–3)
GFR, ESTIMATED: 51 ML/MIN/1.73M2
GLUCOSE SERPL-MCNC: 91 MG/DL (ref 70–99)
GLUCOSE URINE: NEGATIVE MG/DL
HCT VFR BLD CALC: 39.6 % (ref 37–47)
HEMOGLOBIN: 13.4 GM/DL (ref 12.5–16)
IMMATURE NEUTROPHIL %: 0.3 % (ref 0–0.43)
KETONES, URINE: NEGATIVE MG/DL
LACTIC ACID, SEPSIS: 1.4 MMOL/L (ref 0.4–2)
LEUKOCYTE ESTERASE, URINE: ABNORMAL
LYMPHOCYTES ABSOLUTE: 1.4 K/CU MM
LYMPHOCYTES RELATIVE PERCENT: 16.5 % (ref 24–44)
MCH RBC QN AUTO: 33.5 PG (ref 27–31)
MCHC RBC AUTO-ENTMCNC: 33.8 % (ref 32–36)
MCV RBC AUTO: 99 FL (ref 78–100)
MONOCYTES ABSOLUTE: 0.8 K/CU MM
MONOCYTES RELATIVE PERCENT: 8.6 % (ref 0–4)
MUCUS: ABNORMAL HPF
NEUTROPHILS ABSOLUTE: 6.4 K/CU MM
NEUTROPHILS RELATIVE PERCENT: 73.3 % (ref 36–66)
NITRITE URINE, QUANTITATIVE: POSITIVE
NUCLEATED RBC %: 0 %
PDW BLD-RTO: 12.2 % (ref 11.7–14.9)
PH, URINE: 6.5 (ref 5–8)
PLATELET # BLD: 307 K/CU MM (ref 140–440)
PMV BLD AUTO: 8.5 FL (ref 7.5–11.1)
POTASSIUM SERPL-SCNC: 4.3 MMOL/L (ref 3.5–5.1)
PROCALCITONIN SERPL-MCNC: 0.1 NG/ML
PROTEIN UA: 30 MG/DL
RBC # BLD: 4 M/CU MM (ref 4.2–5.4)
RBC URINE: 6 /HPF (ref 0–6)
SODIUM BLD-SCNC: 135 MMOL/L (ref 135–145)
SPECIFIC GRAVITY UA: <1.005 (ref 1–1.03)
TOTAL IMMATURE NEUTOROPHIL: 0.03 K/CU MM
TOTAL NUCLEATED RBC: 0 K/CU MM
TOTAL PROTEIN: 7.3 GM/DL (ref 6.4–8.2)
TRICHOMONAS: ABNORMAL /HPF
UROBILINOGEN, URINE: 0.2 MG/DL (ref 0.2–1)
WBC # BLD: 8.7 K/CU MM (ref 4–10.5)
WBC UA: 8 /HPF (ref 0–5)

## 2024-07-22 PROCEDURE — 87186 SC STD MICRODIL/AGAR DIL: CPT

## 2024-07-22 PROCEDURE — 6360000002 HC RX W HCPCS: Performed by: STUDENT IN AN ORGANIZED HEALTH CARE EDUCATION/TRAINING PROGRAM

## 2024-07-22 PROCEDURE — 87040 BLOOD CULTURE FOR BACTERIA: CPT

## 2024-07-22 PROCEDURE — 99285 EMERGENCY DEPT VISIT HI MDM: CPT

## 2024-07-22 PROCEDURE — 96365 THER/PROPH/DIAG IV INF INIT: CPT

## 2024-07-22 PROCEDURE — 1200000000 HC SEMI PRIVATE

## 2024-07-22 PROCEDURE — 80053 COMPREHEN METABOLIC PANEL: CPT

## 2024-07-22 PROCEDURE — 93005 ELECTROCARDIOGRAM TRACING: CPT | Performed by: EMERGENCY MEDICINE

## 2024-07-22 PROCEDURE — 6370000000 HC RX 637 (ALT 250 FOR IP): Performed by: STUDENT IN AN ORGANIZED HEALTH CARE EDUCATION/TRAINING PROGRAM

## 2024-07-22 PROCEDURE — 87086 URINE CULTURE/COLONY COUNT: CPT

## 2024-07-22 PROCEDURE — 94640 AIRWAY INHALATION TREATMENT: CPT

## 2024-07-22 PROCEDURE — 2580000003 HC RX 258: Performed by: STUDENT IN AN ORGANIZED HEALTH CARE EDUCATION/TRAINING PROGRAM

## 2024-07-22 PROCEDURE — 93010 ELECTROCARDIOGRAM REPORT: CPT | Performed by: INTERNAL MEDICINE

## 2024-07-22 PROCEDURE — 71045 X-RAY EXAM CHEST 1 VIEW: CPT

## 2024-07-22 PROCEDURE — 85025 COMPLETE CBC W/AUTO DIFF WBC: CPT

## 2024-07-22 PROCEDURE — 2580000003 HC RX 258: Performed by: EMERGENCY MEDICINE

## 2024-07-22 PROCEDURE — 81001 URINALYSIS AUTO W/SCOPE: CPT

## 2024-07-22 PROCEDURE — 87077 CULTURE AEROBIC IDENTIFY: CPT

## 2024-07-22 PROCEDURE — 83605 ASSAY OF LACTIC ACID: CPT

## 2024-07-22 PROCEDURE — 6360000002 HC RX W HCPCS: Performed by: EMERGENCY MEDICINE

## 2024-07-22 PROCEDURE — 84145 PROCALCITONIN (PCT): CPT

## 2024-07-22 RX ORDER — SODIUM CHLORIDE 0.9 % (FLUSH) 0.9 %
5-40 SYRINGE (ML) INJECTION PRN
Status: DISCONTINUED | OUTPATIENT
Start: 2024-07-22 | End: 2024-07-24 | Stop reason: HOSPADM

## 2024-07-22 RX ORDER — LIDOCAINE 50 MG/G
1 PATCH TOPICAL DAILY
COMMUNITY

## 2024-07-22 RX ORDER — POLYETHYLENE GLYCOL 3350 17 G/17G
17 POWDER, FOR SOLUTION ORAL 2 TIMES DAILY PRN
Status: DISCONTINUED | OUTPATIENT
Start: 2024-07-22 | End: 2024-07-24 | Stop reason: HOSPADM

## 2024-07-22 RX ORDER — LANOLIN ALCOHOL/MO/W.PET/CERES
3 CREAM (GRAM) TOPICAL NIGHTLY PRN
Status: DISCONTINUED | OUTPATIENT
Start: 2024-07-22 | End: 2024-07-24 | Stop reason: HOSPADM

## 2024-07-22 RX ORDER — ENOXAPARIN SODIUM 100 MG/ML
40 INJECTION SUBCUTANEOUS DAILY
Status: DISCONTINUED | OUTPATIENT
Start: 2024-07-22 | End: 2024-07-24 | Stop reason: HOSPADM

## 2024-07-22 RX ORDER — ALBUTEROL SULFATE 90 UG/1
2 AEROSOL, METERED RESPIRATORY (INHALATION)
Status: DISCONTINUED | OUTPATIENT
Start: 2024-07-22 | End: 2024-07-24

## 2024-07-22 RX ORDER — ESCITALOPRAM OXALATE 10 MG/1
20 TABLET ORAL NIGHTLY
Status: DISCONTINUED | OUTPATIENT
Start: 2024-07-23 | End: 2024-07-24 | Stop reason: HOSPADM

## 2024-07-22 RX ORDER — GABAPENTIN 800 MG/1
800 TABLET ORAL 3 TIMES DAILY
Status: DISCONTINUED | OUTPATIENT
Start: 2024-07-22 | End: 2024-07-22

## 2024-07-22 RX ORDER — ASPIRIN 81 MG/1
81 TABLET, CHEWABLE ORAL DAILY
Status: DISCONTINUED | OUTPATIENT
Start: 2024-07-23 | End: 2024-07-24 | Stop reason: HOSPADM

## 2024-07-22 RX ORDER — 0.9 % SODIUM CHLORIDE 0.9 %
30 INTRAVENOUS SOLUTION INTRAVENOUS ONCE
Status: COMPLETED | OUTPATIENT
Start: 2024-07-22 | End: 2024-07-22

## 2024-07-22 RX ORDER — BISACODYL 5 MG/1
5 TABLET, DELAYED RELEASE ORAL DAILY PRN
Status: DISCONTINUED | OUTPATIENT
Start: 2024-07-22 | End: 2024-07-24 | Stop reason: HOSPADM

## 2024-07-22 RX ORDER — SODIUM CHLORIDE 9 MG/ML
INJECTION, SOLUTION INTRAVENOUS PRN
Status: DISCONTINUED | OUTPATIENT
Start: 2024-07-22 | End: 2024-07-24 | Stop reason: HOSPADM

## 2024-07-22 RX ORDER — OXYCODONE HYDROCHLORIDE AND ACETAMINOPHEN 5; 325 MG/1; MG/1
1 TABLET ORAL EVERY 6 HOURS PRN
Status: DISCONTINUED | OUTPATIENT
Start: 2024-07-22 | End: 2024-07-24 | Stop reason: HOSPADM

## 2024-07-22 RX ORDER — SODIUM CHLORIDE 0.9 % (FLUSH) 0.9 %
5-40 SYRINGE (ML) INJECTION EVERY 12 HOURS SCHEDULED
Status: DISCONTINUED | OUTPATIENT
Start: 2024-07-22 | End: 2024-07-24 | Stop reason: HOSPADM

## 2024-07-22 RX ORDER — GABAPENTIN 300 MG/1
600 CAPSULE ORAL 3 TIMES DAILY
Status: DISCONTINUED | OUTPATIENT
Start: 2024-07-22 | End: 2024-07-24 | Stop reason: HOSPADM

## 2024-07-22 RX ORDER — OXYCODONE HYDROCHLORIDE AND ACETAMINOPHEN 5; 325 MG/1; MG/1
1 TABLET ORAL EVERY 6 HOURS PRN
COMMUNITY

## 2024-07-22 RX ORDER — ALBUTEROL SULFATE 90 UG/1
AEROSOL, METERED RESPIRATORY (INHALATION)
COMMUNITY
Start: 2017-12-12

## 2024-07-22 RX ORDER — ACETAMINOPHEN 650 MG/1
650 SUPPOSITORY RECTAL EVERY 6 HOURS PRN
Status: DISCONTINUED | OUTPATIENT
Start: 2024-07-22 | End: 2024-07-24 | Stop reason: HOSPADM

## 2024-07-22 RX ORDER — PANTOPRAZOLE SODIUM 40 MG/1
40 TABLET, DELAYED RELEASE ORAL
Status: DISCONTINUED | OUTPATIENT
Start: 2024-07-23 | End: 2024-07-24 | Stop reason: HOSPADM

## 2024-07-22 RX ORDER — ACETAMINOPHEN 325 MG/1
650 TABLET ORAL EVERY 6 HOURS PRN
Status: DISCONTINUED | OUTPATIENT
Start: 2024-07-22 | End: 2024-07-24 | Stop reason: HOSPADM

## 2024-07-22 RX ADMIN — SODIUM CHLORIDE, PRESERVATIVE FREE 10 ML: 5 INJECTION INTRAVENOUS at 21:26

## 2024-07-22 RX ADMIN — SODIUM CHLORIDE 1716 ML: 9 INJECTION, SOLUTION INTRAVENOUS at 13:51

## 2024-07-22 RX ADMIN — DICLOFENAC SODIUM 2 G: 10 GEL TOPICAL at 21:27

## 2024-07-22 RX ADMIN — PIPERACILLIN AND TAZOBACTAM 3375 MG: 3; .375 INJECTION, POWDER, LYOPHILIZED, FOR SOLUTION INTRAVENOUS at 13:52

## 2024-07-22 RX ADMIN — GABAPENTIN 600 MG: 300 CAPSULE ORAL at 21:25

## 2024-07-22 RX ADMIN — SODIUM CHLORIDE: 9 INJECTION, SOLUTION INTRAVENOUS at 21:39

## 2024-07-22 RX ADMIN — ALBUTEROL SULFATE 2 PUFF: 90 AEROSOL, METERED RESPIRATORY (INHALATION) at 20:48

## 2024-07-22 RX ADMIN — OXYCODONE HYDROCHLORIDE AND ACETAMINOPHEN 1 TABLET: 5; 325 TABLET ORAL at 21:25

## 2024-07-22 RX ADMIN — PIPERACILLIN AND TAZOBACTAM 3375 MG: 3; .375 INJECTION, POWDER, LYOPHILIZED, FOR SOLUTION INTRAVENOUS at 21:39

## 2024-07-22 ASSESSMENT — PAIN SCALES - GENERAL
PAINLEVEL_OUTOF10: 0
PAINLEVEL_OUTOF10: 0
PAINLEVEL_OUTOF10: 7

## 2024-07-22 ASSESSMENT — ENCOUNTER SYMPTOMS
ABDOMINAL DISTENTION: 0
SORE THROAT: 0
CONSTIPATION: 0
DIARRHEA: 0
BACK PAIN: 0
EYE DISCHARGE: 0
SHORTNESS OF BREATH: 0
WHEEZING: 0
COUGH: 0

## 2024-07-22 ASSESSMENT — PAIN DESCRIPTION - DESCRIPTORS: DESCRIPTORS: ACHING

## 2024-07-22 ASSESSMENT — PAIN - FUNCTIONAL ASSESSMENT
PAIN_FUNCTIONAL_ASSESSMENT: PREVENTS OR INTERFERES SOME ACTIVE ACTIVITIES AND ADLS
PAIN_FUNCTIONAL_ASSESSMENT: 0-10

## 2024-07-22 ASSESSMENT — PAIN DESCRIPTION - LOCATION: LOCATION: SHOULDER

## 2024-07-22 ASSESSMENT — PAIN DESCRIPTION - ORIENTATION: ORIENTATION: LEFT

## 2024-07-22 NOTE — ED PROVIDER NOTES
Triage Chief Complaint:   positive blood cultures (Received a call today that her blood cultures came back positive. )    [place]:  Lela Lima is a 78 y.o. female that presents after being called by our ED pharmacist to return to the hospital given positive blood cultures.  Patient was seen here late last week and was found to have likely urinary tract infection in the setting of prior cystectomy and ileal conduit.  Patient had had some blood in urine which was a new change for her.  Patient had also been with increased fatigue and fevers at home.  Patient was placed on Keflex course over the weekend but 2 out of 4 blood cultures came back growing gram-negative rods and patient was encouraged to come back to the emergency department.  Patient does report that the blood in the urine seems to have cleared and some of her symptoms seem to be improving on the Keflex.  No other new complaints at this time.    ROS:  General:  + fevers, no chills, + generalized weakness, + fatigue  Eyes:  No recent vison changes, no discharge  ENT:  No sore throat, no nasal congestion, no hearing changes  Cardiovascular:  No chest pain, no palpitations  Respiratory:  No shortness of breath, no cough, no wheezing  Gastrointestinal:  No pain, no nausea, no vomiting, no diarrhea  Musculoskeletal:  No muscle pain, no joint pain  Skin:  No rash, no pruritis, no easy bruising  Neurologic:  No speech problems, no headache, no extremity numbness, no extremity tingling, no extremity weakness  Psychiatric:  No anxiety  Genitourinary:  No dysuria, + hematuria  Extremities:  no edema, no pain    Past Medical History:   Diagnosis Date    Anxiety     Back pain     Cancer (HCC)     bladder    Cardiac abnormality     Falls      Past Surgical History:   Procedure Laterality Date    BLADDER REMOVAL      FOOT SURGERY      HYSTERECTOMY (CERVIX STATUS UNKNOWN)      INNER EAR SURGERY Right 1983    SINUS SURGERY       History reviewed. No pertinent family    Radiographs (if obtained):  [] The following radiograph was interpreted by myself in the absence of a radiologist:   [] Radiologist's Report Reviewed:  XR CHEST PORTABLE    (Results Pending)         EKG (if obtained): (All EKG's are interpreted by myself in the absence of a cardiologist)    Chart review shows recent radiographs:  XR CHEST PORTABLE    Result Date: 7/18/2024  Chest X-ray INDICATION: Sepsis COMPARISON: Reviewed TECHNIQUE: AP/PA view of the chest was obtained. FINDINGS: Stable calcified granuloma in the left midlung. Reidentified left-sided rib fractures. The lungs are clear. There are no infiltrates or effusions.  The heart size is normal.  The bony thorax is intact.      No acute findings in the chest Electronically signed by Tomás Son      MDM:  CC/HPI Summary, DDx, ED Course, and Reassessment:  Pt presents as above.  Emergent conditions considered.  Presentation prompted initial broad workup.  Sensitivities are not back on the culture results I did discuss with our ED pharmacist antibiotic choice at this time we will treat broadly with Zosyn.  Fresh cultures are sent as well as new blood work.  Patient is hemodynamically stable and thankfully seems to be improving on the Keflex.  Decision made to admit however for further evaluation of bacteremia and ID consultation.  Hospitalist is consulted for the admission.    History from : Patient and Family      Limitations to history : None    Patient was given the following medications:  Medications   sodium chloride 0.9 % bolus 1,716 mL (has no administration in time range)   piperacillin-tazobactam (ZOSYN) 3,375 mg in sodium chloride 0.9 % 50 mL IVPB (mini-bag) (has no administration in time range)       Independent Imaging Interpretation by me: NA    Chronic conditions affecting care: Prior cystectomy and ileal conduit creation    Discussion with Other Profesionals : Admitting Team (hospitalist)    Social Determinants : None    Records Reviewed :  None    Disposition Considerations (tests considered but not done, Shared Decision Making, Pt Expectation of Test or Tx.):   Patient admitted.  Questions sought and answered with the patient and family. They voice understanding and agree with plan.     I am the Primary Clinician of Record.    Is this patient to be included in the SEP-1 Core Measure due to severe sepsis or septic shock?   No   Exclusion criteria - the patient is NOT to be included for SEP-1 Core Measure due to:  2+ SIRS criteria are not met                Clinical Impression:  1. Bacteremia      Disposition referral (if applicable):  No follow-up provider specified.  Disposition medications (if applicable):  New Prescriptions    No medications on file       Comment: Please note this report has been produced using speech recognition software and may contain errors related to that system including errors in grammar, punctuation, and spelling, as well as words and phrases that may be inappropriate. If there are any questions or concerns please feel free to contact the dictating provider for clarification.       Pavel Erickson MD  07/24/24 2412

## 2024-07-22 NOTE — TELEPHONE ENCOUNTER
Pharmacy Note  ED Culture Follow-up    Lela Lima is a 78 y.o. female.     Allergies: Iodine and Wellbutrin [bupropion]     Labs:  Lab Results   Component Value Date    BUN 22 07/18/2024    CREATININE 0.9 07/18/2024    WBC 7.8 07/18/2024     Estimated Creatinine Clearance: 39 mL/min (based on SCr of 0.9 mg/dL).    Current antimicrobials:   Cephalexin    ASSESSMENT:  Micro results:   Blood culture: positive for 1/4 gram negative bacilli     PLAN:  Need for intervention: Yes  Discussed with: Dr. Peres  Chosen treatment:    Spoke with patient's daughter. Informed of blood culture result and instructed daughter to have patient immediately return to ED.    Patient response:   Called and spoke with patient's daughter    Called/sent in prescription to: Not applicable    Please call with any questions. Ext. [phone]    Jasmine Heard RPH, PharmD 9:31 AM 7/22/2024

## 2024-07-22 NOTE — H&P
V2.0  History and Physical      Name:  Lela Lima /Age/Sex: 1945  (78 y.o. female)   MRN & CSN:  [id number] & [id number] Encounter Date/Time: 2024 3:20 PM EDT   Location:  ED18/ED-18 PCP: Kathy Mathew MD       Hospital Day: 1    Assessment and Plan:   Lela Lima is a 78 y.o. female with a pmh of anxiety, back pain, bladder cancer s/p ileal conduit  who presents with Bacteremia    Hospital Problems             Last Modified POA    * (Principal) Bacteremia 2024 Yes     Bacteremia  -Patient presented to ED on .  Discharged with p.o. antibiotics for suspected UTI.  -Blood cultures growing GNR in 2/4 bottles.  Patient called back for IV antibiotics  -Patient is hemodynamically stable, lab work nonacute  -Treated with IV Zosyn, will continue, de-escalate based on culture results  -Repeat culture sent from ED, follow-up    Chronic comorbidities  Bladder cancer s/p ileal conduit -patient does not have any follow-up as her primary surgeon is no longer in practice.  Will advise patient to follow-up outpatient with a different specialist for follow-up  Chronic back pain, arthralgias-resume home gabapentin, pain medications as needed           Medical Decision Making:  The following items were considered in medical decision making:  Discussion of patient care with other providers  Reviewed clinical lab tests if any  Reviewed radiology tests if any  Reviewed other diagnostic tests/interventions  Independent review of radiologic images if any  Microbiology cultures and other micro tests if any    Estimated time spent for medical decision-making encompassing complexity of the case, history taking, medication review, physical examination, communication with family, RN, , discussion with specialists, and ancillary staff members to provide accurate care for the patient was around 45 minutes.    The billing in this case is level 3 due to the combination of above and  specifically because of bacteremia.    Comment: Please note this report has been produced using speech recognition software and may contain errors related to that system including errors in grammar, punctuation, and spelling, as well as words and phrases that may be inappropriate. If there are any questions or concerns please feel free to contact the dictating provider for clarification.       Disposition:   Current Living situation: Home  Expected Disposition: Home  Estimated D/C: 2 to 3 days    Diet ADULT DIET; Regular; Low Fat/Low Chol/High Fiber/2 gm Na   DVT Prophylaxis [x] Lovenox, []  Heparin, [] SCDs, [] Ambulation,  [] Eliquis, [] Xarelto   Code Status Full Code   Surrogate Decision Maker/ POA Daughter Mansi Lima     History from:     patient, electronic medical record    History of Present Illness:     Chief Complaint: Bacteremia  Lela Lima is a 78 y.o. female with pmh of as above who presents with bacteremia.  Patient presented to ED 1 week ago and was discharged on p.o. antibiotics.  Blood cultures drawn at that point grew gram-negative rods.  Patient was called and asked to come back to the hospital for IV antibiotics.  Hospitalist service consulted for admission.    Patient seen and evaluated at bedside.  Alert and oriented.  Denies any active complaints at present.  Daughter is also present at bedside.     Review of Systems: Need 10 Elements   Review of Systems   Constitutional:  Negative for activity change, appetite change, diaphoresis and fatigue.   HENT:  Negative for congestion and sore throat.    Eyes:  Negative for discharge and visual disturbance.   Respiratory:  Negative for cough, shortness of breath and wheezing.    Cardiovascular:  Negative for chest pain, palpitations and leg swelling.   Gastrointestinal:  Negative for abdominal distention, constipation and diarrhea.   Genitourinary:  Negative for difficulty urinating and hematuria.   Musculoskeletal:  Negative for arthralgias,  acute findings in the chest Electronically signed by Tomás Son      Personally reviewed Lab Studies, Imaging, and discussed case with patient.     Electronically signed by Jam Ray MD on 7/22/2024 at 3:20 PM

## 2024-07-22 NOTE — PROGRESS NOTES
Pharmacy Note  ED Culture Follow-up    Lela Lima is a 78 y.o. female.     Allergies: Iodine and Wellbutrin [bupropion]     Labs:  Lab Results   Component Value Date    BUN 22 07/18/2024    CREATININE 0.9 07/18/2024    WBC 7.8 07/18/2024     Estimated Creatinine Clearance: 39 mL/min (based on SCr of 0.9 mg/dL).    Current antimicrobials:   Cephalexin    ASSESSMENT:  Micro results:   Blood culture: positive for 1/4 gram negative bacilli     PLAN:  Need for intervention: Yes  Discussed with: Dr. Peres  Chosen treatment:    Spoke with patient's daughter. Informed of blood culture result and instructed daughter to have patient immediately return to ED.    Patient response:   Called and spoke with patient's daughter    Called/sent in prescription to: Not applicable    Please call with any questions. Ext. [phone]    Jasmine Heard RPH, PharmD 9:31 AM 7/22/2024

## 2024-07-22 NOTE — ED NOTES
Medication History  [facility]    Patient Name: Lela Lima 1945     Medication history has been completed by: Lien Tejeda [facility]    Source(s) of information: patient/family and insurance claims     Primary Care Physician: Kathy Mathew MD     Pharmacy: [place], [place]    Allergies as of 07/22/2024 - Fully Reviewed 07/22/2024   Allergen Reaction Noted    Iodine Hives 07/14/2023    Wellbutrin [bupropion] Hallucinations 07/14/2023        Prior to Admission medications    Medication Sig Start Date End Date Taking? Authorizing Provider   albuterol sulfate HFA (VENTOLIN HFA) 108 (90 Base) MCG/ACT inhaler Inhale into the lungs every 4-6 hours as needed 12/12/17  Yes Sunil Figueroa MD   Calcium Carb-Cholecalciferol (OYSTER SHELL CALCIUM W/D) 500-5 MG-MCG TABS tablet Take 2 tablets by mouth 2 times daily 7/4/24  Yes Sunil Figueroa MD   diclofenac sodium (VOLTAREN) 1 % GEL Apply 2 g topically nightly 07/22/24 Applies to bilateral legs    Sunil Figueroa MD   oxyCODONE-acetaminophen (PERCOCET) 5-325 MG per tablet Take 1 tablet by mouth every 6 hours as needed.    ProviderSunil MD   lidocaine (LIDODERM) 5 % Place 1 patch onto the skin daily 07/22/24 Patient applies to shoulder and lower back    ProviderSunil MD   cephALEXin (KEFLEX) 500 MG capsule Take 1 capsule by mouth 3 times daily for 7 days 7/19/24 7/26/24  Aleks Serrano MD   polyethylene glycol (MIRALAX) 17 g packet Take 1 packet by mouth 2 times daily  Patient not taking: Reported on 7/22/2024 6/28/24 7/28/24  Kathy Vaughn, APRN - CNP   bisacodyl (DULCOLAX) 5 MG EC tablet Take 1 tablet by mouth daily as needed for Constipation    Sunil Figueroa MD   gabapentin (NEURONTIN) 800 MG tablet Take 1 tablet by mouth 3 times daily.    Sunil Figueroa MD   escitalopram (LEXAPRO) 20 MG tablet Take 1 tablet by mouth nightly    Sunil Figueroa MD   omeprazole (PRILOSEC) 20 MG  delayed release capsule Take 1 capsule by mouth 2 times daily    Sunil Figueroa MD   Carboxymethylcellulose Sodium (REFRESH TEARS OP) Place 1 drop into both eyes 2 times daily    Sunil Figueroa MD   aspirin 81 MG tablet Take 1 tablet by mouth daily    Sunil Figueroa MD   Multiple Vitamins-Minerals (PRESERVISION AREDS PO) Take by mouth BID    Sunil Figueroa MD   NONFORMULARY Calcium, Magnesium, Zinc tablet    Sunil Figueroa MD   melatonin 3 MG TABS tablet Take 1 tablet by mouth nightly as needed    Sunil Figueroa MD     Medications added or changed (ex. new medication, dosage change, interval change, formulation change):  Oyster shell (added)  Lidocaine patch (added)  Percocet (added)  Diclofenac gel (added)  Escitalopram dosage change from 5 mg to 20 mg    Medications removed from list (include reason, ex. noncompliance, medication cost, therapy complete etc.):   Oxycodone not taking  IBU prescription not taking  Cepacol not taking    Comments:  Medication list reviewed with patient/family and insurance claims verified.  Patient reports she has taken first dose of medications today.    To my knowledge the above medication history is accurate as of 7/22/2024 1:57 PM.   Lien Tejeda CP   7/22/2024 1:57 PM

## 2024-07-22 NOTE — ED NOTES
ED TO INPATIENT SBAR HANDOFF    Patient Name: Lela Lima   :  1945  78 y.o.   Preferred Name  Lela  Family/Caregiver Present yes   Restraints no   C-SSRS: Risk of Suicide: No Risk  Sitter no   Sepsis Risk Score        Situation  Chief Complaint   Patient presents with    positive blood cultures     Received a call today that her blood cultures came back positive.      Brief Description of Patient's Condition:Pt called and told tocome to ED for 1 positive set of blood cultures. Pt has no complaints. Pt has urostomy from bladder cancer in the past. Pt alert and oriented.   Mental Status: oriented, alert, coherent, logical, thought processes intact, and able to concentrate and follow conversation  Arrived from: home    Imaging:   XR CHEST PORTABLE   Final Result   No acute cardiopulmonary abnormality.         Electronically signed by Marcus Goodman        Abnormal labs:   Abnormal Labs Reviewed   CBC WITH AUTO DIFFERENTIAL - Abnormal; Notable for the following components:       Result Value    RBC 4.00 (*)     MCH 33.5 (*)     Neutrophils % 73.3 (*)     Lymphocytes % 16.5 (*)     Monocytes % 8.6 (*)     All other components within normal limits   COMPREHENSIVE METABOLIC PANEL - Abnormal; Notable for the following components:    Chloride 96 (*)     Est, Glom Filt Rate 51 (*)     All other components within normal limits        Background  History:   Past Medical History:   Diagnosis Date    Anxiety     Back pain     Cancer (HCC)     bladder    Cardiac abnormality     Falls        Assessment    Vitals:        Vitals:    24 1233 24 1243   BP: (!) 124/55    Pulse: 79    Resp: 17    Temp:  98 °F (36.7 °C)   SpO2: 98%      PO Status: Regular  O2 Flow Rate:      Cardiac Rhythm: NSR  Last documented pain medication administered: n/a  NIH Score: NIH     Active LDA's:   Peripheral IV 24 Left Antecubital (Active)       Pertinent or High Risk Medications/Drips: no   If Yes, please provide details:    Blood Product Administration: no  If Yes, please provide details:     Recommendation    Incomplete orders   Additional Comments:    If any further questions, please call Sending RN at [phone]    Electronically signed by: Electronically signed by Elva Gallagher RN on 7/22/2024 at 2:08 PM

## 2024-07-22 NOTE — PROGRESS NOTES
4 Eyes Skin Assessment     NAME:  Lela Lima  YOB: 1945  MEDICAL RECORD NUMBER:  [id number]    The patient is being assessed for  Admission    I agree that at least one RN has performed a thorough Head to Toe Skin Assessment on the patient. ALL assessment sites listed below have been assessed.      Areas assessed by both nurses:    Head, Face, Ears, Shoulders, Back, Chest, Arms, Elbows, Hands, Sacrum. Buttock, Coccyx, Ischium, Legs. Feet and Heels, and Under Medical Devices         Does the Patient have a Wound? No noted wound(s)       Tarun Prevention initiated by RN: No  Wound Care Orders initiated by RN: No    Pressure Injury (Stage 3,4, Unstageable, DTI, NWPT, and Complex wounds) if present, place Wound referral order by RN under : No    New Ostomies, if present place, Ostomy referral order under : Yes     Nurse 1 eSignature: Electronically signed by Farrah Heaton RN on 7/22/24 at 6:34 PM EDT    **SHARE this note so that the co-signing nurse can place an eSignature**    Nurse 2 eSignature: Electronically signed by Josephine Montaño RN on 7/22/24 at 6:35 PM EDT

## 2024-07-22 NOTE — ED NOTES
Lab called with 1/4 blood cultures positive for gram neg bacilli. Spoke with Dr Peres - advised patient needs to come back. Attempted to call patient but no answer. VM left and Morenita ferrer RN notified.

## 2024-07-23 PROBLEM — T85.858A STENOSIS OF ILEAL CONDUIT STOMA: Status: ACTIVE | Noted: 2024-07-23

## 2024-07-23 PROBLEM — R78.81 BACTEREMIA DUE TO GRAM-NEGATIVE BACTERIA: Status: ACTIVE | Noted: 2024-07-23

## 2024-07-23 LAB
ANION GAP SERPL CALCULATED.3IONS-SCNC: 10 MMOL/L (ref 7–16)
BASOPHILS ABSOLUTE: 0 K/CU MM
BASOPHILS RELATIVE PERCENT: 0.5 % (ref 0–1)
BUN SERPL-MCNC: 21 MG/DL (ref 6–23)
CALCIUM SERPL-MCNC: 8.7 MG/DL (ref 8.3–10.6)
CHLORIDE BLD-SCNC: 106 MMOL/L (ref 99–110)
CO2: 25 MMOL/L (ref 21–32)
CREAT SERPL-MCNC: 1.1 MG/DL (ref 0.6–1.1)
DIFFERENTIAL TYPE: ABNORMAL
EOSINOPHILS ABSOLUTE: 0.1 K/CU MM
EOSINOPHILS RELATIVE PERCENT: 1.8 % (ref 0–3)
GFR, ESTIMATED: 51 ML/MIN/1.73M2
GLUCOSE SERPL-MCNC: 93 MG/DL (ref 70–99)
HCT VFR BLD CALC: 33.6 % (ref 37–47)
HEMOGLOBIN: 11.2 GM/DL (ref 12.5–16)
IMMATURE NEUTROPHIL %: 0.4 % (ref 0–0.43)
LYMPHOCYTES ABSOLUTE: 2.3 K/CU MM
LYMPHOCYTES RELATIVE PERCENT: 41.1 % (ref 24–44)
MCH RBC QN AUTO: 33.4 PG (ref 27–31)
MCHC RBC AUTO-ENTMCNC: 33.3 % (ref 32–36)
MCV RBC AUTO: 100.3 FL (ref 78–100)
MONOCYTES ABSOLUTE: 0.6 K/CU MM
MONOCYTES RELATIVE PERCENT: 9.7 % (ref 0–4)
NEUTROPHILS ABSOLUTE: 2.6 K/CU MM
NEUTROPHILS RELATIVE PERCENT: 46.5 % (ref 36–66)
NUCLEATED RBC %: 0 %
PDW BLD-RTO: 12.6 % (ref 11.7–14.9)
PLATELET # BLD: 311 K/CU MM (ref 140–440)
PMV BLD AUTO: 8.5 FL (ref 7.5–11.1)
POTASSIUM SERPL-SCNC: 5.1 MMOL/L (ref 3.5–5.1)
RBC # BLD: 3.35 M/CU MM (ref 4.2–5.4)
SODIUM BLD-SCNC: 141 MMOL/L (ref 135–145)
TOTAL IMMATURE NEUTOROPHIL: 0.02 K/CU MM
TOTAL NUCLEATED RBC: 0 K/CU MM
WBC # BLD: 5.7 K/CU MM (ref 4–10.5)

## 2024-07-23 PROCEDURE — 94640 AIRWAY INHALATION TREATMENT: CPT

## 2024-07-23 PROCEDURE — 94664 DEMO&/EVAL PT USE INHALER: CPT

## 2024-07-23 PROCEDURE — 80048 BASIC METABOLIC PNL TOTAL CA: CPT

## 2024-07-23 PROCEDURE — 6360000002 HC RX W HCPCS: Performed by: STUDENT IN AN ORGANIZED HEALTH CARE EDUCATION/TRAINING PROGRAM

## 2024-07-23 PROCEDURE — 2580000003 HC RX 258: Performed by: STUDENT IN AN ORGANIZED HEALTH CARE EDUCATION/TRAINING PROGRAM

## 2024-07-23 PROCEDURE — 94761 N-INVAS EAR/PLS OXIMETRY MLT: CPT

## 2024-07-23 PROCEDURE — 6370000000 HC RX 637 (ALT 250 FOR IP): Performed by: STUDENT IN AN ORGANIZED HEALTH CARE EDUCATION/TRAINING PROGRAM

## 2024-07-23 PROCEDURE — 36415 COLL VENOUS BLD VENIPUNCTURE: CPT

## 2024-07-23 PROCEDURE — 1200000000 HC SEMI PRIVATE

## 2024-07-23 PROCEDURE — 99223 1ST HOSP IP/OBS HIGH 75: CPT | Performed by: INTERNAL MEDICINE

## 2024-07-23 PROCEDURE — APPSS60 APP SPLIT SHARED TIME 46-60 MINUTES: Performed by: NURSE PRACTITIONER

## 2024-07-23 PROCEDURE — 89220 SPUTUM SPECIMEN COLLECTION: CPT

## 2024-07-23 PROCEDURE — 85025 COMPLETE CBC W/AUTO DIFF WBC: CPT

## 2024-07-23 RX ORDER — HYDROXYZINE HYDROCHLORIDE 25 MG/1
25 TABLET, FILM COATED ORAL 3 TIMES DAILY PRN
Status: DISCONTINUED | OUTPATIENT
Start: 2024-07-23 | End: 2024-07-24 | Stop reason: HOSPADM

## 2024-07-23 RX ADMIN — ACETAMINOPHEN 650 MG: 325 TABLET ORAL at 10:20

## 2024-07-23 RX ADMIN — GABAPENTIN 600 MG: 300 CAPSULE ORAL at 20:43

## 2024-07-23 RX ADMIN — SODIUM CHLORIDE: 9 INJECTION, SOLUTION INTRAVENOUS at 12:35

## 2024-07-23 RX ADMIN — ALBUTEROL SULFATE 2 PUFF: 90 AEROSOL, METERED RESPIRATORY (INHALATION) at 20:20

## 2024-07-23 RX ADMIN — ESCITALOPRAM OXALATE 20 MG: 10 TABLET ORAL at 20:43

## 2024-07-23 RX ADMIN — OXYCODONE HYDROCHLORIDE AND ACETAMINOPHEN 1 TABLET: 5; 325 TABLET ORAL at 20:48

## 2024-07-23 RX ADMIN — DICLOFENAC SODIUM 2 G: 10 GEL TOPICAL at 21:00

## 2024-07-23 RX ADMIN — ASPIRIN 81 MG: 81 TABLET, CHEWABLE ORAL at 10:39

## 2024-07-23 RX ADMIN — SODIUM CHLORIDE, PRESERVATIVE FREE 10 ML: 5 INJECTION INTRAVENOUS at 10:08

## 2024-07-23 RX ADMIN — ALBUTEROL SULFATE 2 PUFF: 90 AEROSOL, METERED RESPIRATORY (INHALATION) at 11:32

## 2024-07-23 RX ADMIN — GABAPENTIN 600 MG: 300 CAPSULE ORAL at 10:08

## 2024-07-23 RX ADMIN — ENOXAPARIN SODIUM 40 MG: 100 INJECTION SUBCUTANEOUS at 10:09

## 2024-07-23 RX ADMIN — ALBUTEROL SULFATE 2 PUFF: 90 AEROSOL, METERED RESPIRATORY (INHALATION) at 07:48

## 2024-07-23 RX ADMIN — PIPERACILLIN AND TAZOBACTAM 3375 MG: 3; .375 INJECTION, POWDER, LYOPHILIZED, FOR SOLUTION INTRAVENOUS at 05:25

## 2024-07-23 RX ADMIN — SODIUM CHLORIDE, PRESERVATIVE FREE 10 ML: 5 INJECTION INTRAVENOUS at 20:40

## 2024-07-23 RX ADMIN — GABAPENTIN 600 MG: 300 CAPSULE ORAL at 14:14

## 2024-07-23 RX ADMIN — SODIUM CHLORIDE: 9 INJECTION, SOLUTION INTRAVENOUS at 05:25

## 2024-07-23 RX ADMIN — PIPERACILLIN AND TAZOBACTAM 3375 MG: 3; .375 INJECTION, POWDER, LYOPHILIZED, FOR SOLUTION INTRAVENOUS at 12:36

## 2024-07-23 RX ADMIN — OXYCODONE HYDROCHLORIDE AND ACETAMINOPHEN 1 TABLET: 5; 325 TABLET ORAL at 05:25

## 2024-07-23 RX ADMIN — PANTOPRAZOLE SODIUM 40 MG: 40 TABLET, DELAYED RELEASE ORAL at 05:25

## 2024-07-23 RX ADMIN — PIPERACILLIN AND TAZOBACTAM 3375 MG: 3; .375 INJECTION, POWDER, LYOPHILIZED, FOR SOLUTION INTRAVENOUS at 20:39

## 2024-07-23 ASSESSMENT — PAIN DESCRIPTION - ORIENTATION
ORIENTATION: LEFT;LOWER
ORIENTATION: RIGHT;LEFT
ORIENTATION: RIGHT;LEFT

## 2024-07-23 ASSESSMENT — PAIN DESCRIPTION - LOCATION
LOCATION: LEG;BACK
LOCATION: BACK;HIP;LEG
LOCATION: BACK;HIP;LEG
LOCATION: BACK;KNEE
LOCATION: BACK;KNEE

## 2024-07-23 ASSESSMENT — PAIN DESCRIPTION - DESCRIPTORS
DESCRIPTORS: ACHING

## 2024-07-23 ASSESSMENT — PAIN SCALES - GENERAL
PAINLEVEL_OUTOF10: 5
PAINLEVEL_OUTOF10: 4
PAINLEVEL_OUTOF10: 4
PAINLEVEL_OUTOF10: 7
PAINLEVEL_OUTOF10: 3
PAINLEVEL_OUTOF10: 4
PAINLEVEL_OUTOF10: 4
PAINLEVEL_OUTOF10: 7

## 2024-07-23 ASSESSMENT — PAIN - FUNCTIONAL ASSESSMENT
PAIN_FUNCTIONAL_ASSESSMENT: ACTIVITIES ARE NOT PREVENTED

## 2024-07-23 ASSESSMENT — PAIN DESCRIPTION - PAIN TYPE
TYPE: CHRONIC PAIN
TYPE: CHRONIC PAIN

## 2024-07-23 ASSESSMENT — PAIN DESCRIPTION - DIRECTION: RADIATING_TOWARDS: BACK

## 2024-07-23 ASSESSMENT — PAIN SCALES - WONG BAKER: WONGBAKER_NUMERICALRESPONSE: HURTS A LITTLE BIT

## 2024-07-23 NOTE — CARE COORDINATION
Chart reviewed and met w/ patient to initiate discharge planning. CM introduced self and explained role. Patient is from home w/ her daughter. Patient has PCP and insurance. Patient reports that she has waiver aide services but can not remember which company they are from. Patient has cane and rolling walker. CM discussed possible need for IV ATB at discharge. Patient would like to return home w/ [facility] if needed but states she has been to [place] previously and will consider as back up option in case needed. CM will continue to follow for final ID recs and discharge needs.        07/23/24 5232   Service Assessment   Patient Orientation Alert and Oriented   Cognition Alert   History Provided By Patient   Primary Caregiver Self   Support Systems Children   Patient's Healthcare Decision Maker is: Legal Next of Kin   PCP Verified by CM Yes   Prior Functional Level Independent in ADLs/IADLs   Current Functional Level Independent in ADLs/IADLs   Can patient return to prior living arrangement Yes   Ability to make needs known: Good   Family able to assist with home care needs: Yes   Financial Resources Medicaid;Medicare   Community Resources None   Social/Functional History   Lives With Daughter   Type of Home House   Home Equipment Cane;Walker - Rolling   ADL Assistance Independent   Ambulation Assistance Independent   Transfer Assistance Independent   Discharge Planning   Type of Residence House   Living Arrangements Children   Current Services Prior To Admission C-pap;Emergency Call  System   Potential Assistance Needed [employer]/Passport;Home Care  (Passport for waiver aide services)   Potential Assistance Purchasing Medications No   Type of Home Care Services Aide Services;Housekeeping   Patient expects to be discharged to: [place]   History of falls? 0   Condition of Participation: Discharge Planning   The Patient and/or Patient Representative was provided with a Choice of Provider? Patient   The Patient and/Or Patient  Representative agree with the Discharge Plan? Yes

## 2024-07-23 NOTE — CONSULTS
Infectious Disease Consult Note  2024   Patient Name: Lela Lima : 1945   Impression  GNR in Blood Cultures:  Suspected Polymicrobial Complicated UTI:  Suspicion 2/4 GNR in blood cultures could be bacteremia secondary to a complicated UTI in the setting of an ileal conduit, as pt had an elevated Pct with fevers during her ED visit on 2024.   No reported allergies to ABX. CrCl 32. Afebrile, no leukocytosis. Pct 0.099. Last Pct on 2024 was 0.219.   Past: 2024 BC 2/4 GNR   -BC Pending  Past Urine Culture: 2020: Coliform, pan sensitive Pseudomonas aeruginosa. 3/14/2018-MSSA.   -Past: Urine culture: <50,000 CFU/ml mixed pathogens.   -UA WBC 8, RBC 6, urine culture: Pseudomonas aeruginosa (sensi pending) and Enterobacter cloacae (sensi pending)  -PCXR: no acute CP abnormality.   Ileal Conduit S/p Cystectomy Secondary to Bladder Cancer 2018:  Tobacco Abuse:   Multi-morbidity: per PMHx: anxiety, chronic back pain mgt with gabapentin and bladder cancer s/p bladder removal with ileal conduit placement   Plan:  Continue IV Zosyn 3,375 mg q8h  Await BC and urine cultures for final ABX recs  Will try to transition to po, if able.     Thank you for allowing me to consult in the care of this patient.  ------------------------  REASON FOR CONSULT: Infective syndrome \"GNR bacteremia\"  Requested by: Dr. Andrea Mckeon  HPI:Patient is a 78 y.o.  female with a history of anxiety, chronic back pain mgt with gabapentin and bladder cancer s/p bladder removal with ileal conduit placement who was admitted 2024 for further evaluation and management of positive blood cultures growing 2/4 GNR from a previous ED admission on , she was called to return to the hospital. She was seen in the ED 2024 with presenting complaints of generalized fatigue, weakness, gross hematuria and T-max of 101 F and DC to home with po cephalexin as diagnosed with suspected UTI as urine

## 2024-07-23 NOTE — PROGRESS NOTES
In-Patient Progress Note    Patient:  Lela Lima 78 y.o. female MRN: [id number]     Date of Service: 7/23/2024    Hospital Day: 2      Chief complaint: had concerns including positive blood cultures (Received a call today that her blood cultures came back positive. ).      Assessment and Plan   Lela Lima, a 78 y.o. female, with a history of bladder cancer s/p ileal conduit 2018, chronic back pain, arthralgia, was admitted on 7/22/2024 with complaints of had concerns including positive blood cultures (Received a call today that her blood cultures came back positive. ).    Assessment and plan    GNR bacteremia  Pseudomonas aeruginosa and Enterobacter cloacae UTI  Patient presented to ED on 7/19.  Discharged with p.o. antibiotics for suspected UTI. Blood cultures growing GNR in 2/4 bottles.  Patient called back for IV antibiotics.   -Continue IV Zosyn; enterobacter clocace is not covered on zosyn; ID team is onboard, will defer to them for changing regimen     Chronic comorbidities  Bladder cancer s/p ileal conduit 2018-patient does not have any follow-up as her primary surgeon is no longer in practice.  Will advise patient to follow-up outpatient with a different specialist for follow-up  Chronic back pain, arthralgias-resume home gabapentin, pain medications as needed                 Diet ADULT DIET; Regular; Low Fat/Low Chol/High Fiber/2 gm Na   DVT Prophylaxis [] Lovenox, []  Heparin, [] SCDs, [] Ambulation,  [] Eliquis, [] Xarelto  [] Coumadin   Peptic ulcer prophylaxis Pantoprazole   Code Status Full Code   Disposition From / Current living situation : Home  Expected Disposition: Home  Estimated Date of Discharge: 2 to 3 days days  Patient requires continued admission due to GNR bacteremia   Surrogate Decision Maker/ SONY Mansi Clarence (child)       Personally reviewed Lab Studies and Imaging       Subjective:     Chief Complaint: positive blood cultures (Received a call today that her blood

## 2024-07-24 VITALS
TEMPERATURE: 98.1 F | WEIGHT: 120.81 LBS | HEIGHT: 59 IN | DIASTOLIC BLOOD PRESSURE: 56 MMHG | OXYGEN SATURATION: 95 % | RESPIRATION RATE: 17 BRPM | BODY MASS INDEX: 24.36 KG/M2 | HEART RATE: 76 BPM | SYSTOLIC BLOOD PRESSURE: 105 MMHG

## 2024-07-24 PROBLEM — R78.81 BACTEREMIA: Status: RESOLVED | Noted: 2024-07-22 | Resolved: 2024-07-24

## 2024-07-24 PROBLEM — R78.81 BACTEREMIA DUE TO GRAM-NEGATIVE BACTERIA: Status: RESOLVED | Noted: 2024-07-23 | Resolved: 2024-07-24

## 2024-07-24 PROBLEM — T85.858A STENOSIS OF ILEAL CONDUIT STOMA: Status: RESOLVED | Noted: 2024-07-23 | Resolved: 2024-07-24

## 2024-07-24 LAB
CULTURE: ABNORMAL
CULTURE: NORMAL
Lab: ABNORMAL
Lab: NORMAL
Lab: NORMAL
SPECIMEN: ABNORMAL
SPECIMEN: NORMAL
SPECIMEN: NORMAL

## 2024-07-24 PROCEDURE — 94761 N-INVAS EAR/PLS OXIMETRY MLT: CPT

## 2024-07-24 PROCEDURE — 6370000000 HC RX 637 (ALT 250 FOR IP): Performed by: NURSE PRACTITIONER

## 2024-07-24 PROCEDURE — 6360000002 HC RX W HCPCS: Performed by: STUDENT IN AN ORGANIZED HEALTH CARE EDUCATION/TRAINING PROGRAM

## 2024-07-24 PROCEDURE — 2580000003 HC RX 258: Performed by: STUDENT IN AN ORGANIZED HEALTH CARE EDUCATION/TRAINING PROGRAM

## 2024-07-24 PROCEDURE — 94640 AIRWAY INHALATION TREATMENT: CPT

## 2024-07-24 PROCEDURE — 6370000000 HC RX 637 (ALT 250 FOR IP): Performed by: STUDENT IN AN ORGANIZED HEALTH CARE EDUCATION/TRAINING PROGRAM

## 2024-07-24 PROCEDURE — 99233 SBSQ HOSP IP/OBS HIGH 50: CPT | Performed by: NURSE PRACTITIONER

## 2024-07-24 RX ORDER — CIPROFLOXACIN 500 MG/1
500 TABLET, FILM COATED ORAL EVERY 12 HOURS SCHEDULED
Qty: 13 TABLET | Refills: 0 | Status: SHIPPED | OUTPATIENT
Start: 2024-07-24 | End: 2024-07-31

## 2024-07-24 RX ORDER — LACTOBACILLUS RHAMNOSUS GG 10B CELL
1 CAPSULE ORAL
Status: DISCONTINUED | OUTPATIENT
Start: 2024-07-24 | End: 2024-07-24 | Stop reason: HOSPADM

## 2024-07-24 RX ORDER — CIPROFLOXACIN 500 MG/1
500 TABLET, FILM COATED ORAL EVERY 12 HOURS SCHEDULED
Status: DISCONTINUED | OUTPATIENT
Start: 2024-07-24 | End: 2024-07-24 | Stop reason: HOSPADM

## 2024-07-24 RX ORDER — ALBUTEROL SULFATE 90 UG/1
2 AEROSOL, METERED RESPIRATORY (INHALATION) EVERY 4 HOURS PRN
Status: DISCONTINUED | OUTPATIENT
Start: 2024-07-24 | End: 2024-07-24 | Stop reason: HOSPADM

## 2024-07-24 RX ORDER — LACTOBACILLUS RHAMNOSUS GG 10B CELL
1 CAPSULE ORAL
Qty: 29 CAPSULE | Refills: 0 | Status: SHIPPED | OUTPATIENT
Start: 2024-07-25 | End: 2024-08-23

## 2024-07-24 RX ADMIN — Medication 1 CAPSULE: at 09:22

## 2024-07-24 RX ADMIN — ENOXAPARIN SODIUM 40 MG: 100 INJECTION SUBCUTANEOUS at 09:23

## 2024-07-24 RX ADMIN — GABAPENTIN 600 MG: 300 CAPSULE ORAL at 09:23

## 2024-07-24 RX ADMIN — SODIUM CHLORIDE, PRESERVATIVE FREE 10 ML: 5 INJECTION INTRAVENOUS at 09:23

## 2024-07-24 RX ADMIN — CIPROFLOXACIN 500 MG: 500 TABLET, FILM COATED ORAL at 09:23

## 2024-07-24 RX ADMIN — PIPERACILLIN AND TAZOBACTAM 3375 MG: 3; .375 INJECTION, POWDER, LYOPHILIZED, FOR SOLUTION INTRAVENOUS at 04:18

## 2024-07-24 RX ADMIN — ASPIRIN 81 MG: 81 TABLET, CHEWABLE ORAL at 09:23

## 2024-07-24 RX ADMIN — ALBUTEROL SULFATE 2 PUFF: 90 AEROSOL, METERED RESPIRATORY (INHALATION) at 08:12

## 2024-07-24 RX ADMIN — PANTOPRAZOLE SODIUM 40 MG: 40 TABLET, DELAYED RELEASE ORAL at 06:54

## 2024-07-24 ASSESSMENT — PAIN SCALES - GENERAL: PAINLEVEL_OUTOF10: 4

## 2024-07-24 ASSESSMENT — PAIN SCALES - WONG BAKER
WONGBAKER_NUMERICALRESPONSE: HURTS A LITTLE BIT

## 2024-07-24 NOTE — PROGRESS NOTES
Infectious Disease Progress Note  2024   Patient Name: Lela Lima : 1945   Impression  GNR in Blood Cultures:  Suspected Polymicrobial Complicated UTI:  Suspicion 2/4 GNR in blood cultures could be bacteremia secondary to a complicated UTI in the setting of an ileal conduit, as pt had an elevated Pct with fevers during her ED visit on 2024.   No reported allergies to ABX. CrCl 32. Afebrile, no leukocytosis. Pct 0.099. Last Pct on 2024 was 0.219.   Past: 2024 BC 2/4 GNR ([facility] microlab reports no growth 2024)   -BC Pending  Past Urine Culture: 2020: Coliform, pan sensitive Pseudomonas aeruginosa. 3/14/2018-[facility]SA.   -Past: Urine culture: <50,000 CFU/ml mixed pathogens.   -UA WBC 8, RBC 6, urine culture: Pseudomonas aeruginosa 50,000 CFU/ml (pan sensi) and Enterobacter cloacae 10,000 CFU/ml (sensi ciprofloxacin, levofloxacin, ertapenem, gentamicin, meropenem and Bactrim)  -PCXR: no acute CP abnormality.   Ileal Conduit S/p Cystectomy Secondary to Bladder Cancer 2018:  Tobacco Abuse:   Multi-morbidity: per PMHx: anxiety, chronic back pain mgt with gabapentin and bladder cancer s/p bladder removal with ileal conduit placement   Plan:  DC IV Zosyn   Start po ciprofloxacin 500 mg bid x cumulative 7 days (on CrCl 32) (end date 2024)  Start po lactobacillus 1 daily x 30 days (end date 2024) for Cdif prevention  Follow up with urology  OK from ID standpoint to DC when medically ready    Ongoing Antimicrobial Therapy  Ciprofloxacin -?  Completed Antimicrobial Therapy  Zosyn -?  History:?Interval history noted. Chief complaint: GNR bacteremia. Suspected complicated UTI.  Denies n/v/d/f or untoward effects of antibiotics  Physical Exam:  Vital Signs: BP (!) 144/53   Pulse 65   Temp 98.2 °F (36.8 °C) (Oral)   Resp 13   Ht 1.499 m (4' 11\")   Wt 54.8 kg (120 lb 13 oz)   SpO2 95%   BMI 24.40 kg/m²     Gen: remains A&O x 4  Wounds: C/D/I-Ileal

## 2024-07-24 NOTE — PROGRESS NOTES
Outpatient Pharmacy Progress Note for Meds-to-Beds    Total number of Prescriptions Filled: 3    Additional Documentation:  Medication(s) were delivered to the patient's room prior to discharge      Thank you for letting us serve your patients.  [address] [address]    Phone: [phone]    Fax: [phone]

## 2024-07-24 NOTE — PROGRESS NOTES
Outpatient Pharmacy Progress Note for Meds-to-Beds    Total number of Prescriptions Filled: 2    Additional Documentation:  Patient picked-up the medication(s) in the OP Pharmacy      Thank you for letting us serve your patients.  [address] [address]    Phone: [phone]    Fax: [phone]

## 2024-07-24 NOTE — PLAN OF CARE
Problem: Discharge Planning  Goal: Discharge to home or other facility with appropriate resources  7/23/2024 2246 by Zari Velázquez LPN  Outcome: Progressing  7/23/2024 1859 by Farrah Heaton RN  Outcome: Progressing     Problem: ABCDS Injury Assessment  Goal: Absence of physical injury  7/23/2024 2246 by Zari Velázquez LPN  Outcome: Progressing  7/23/2024 1859 by Farrah Heaton RN  Outcome: Progressing     Problem: Safety - Adult  Goal: Free from fall injury  Outcome: Progressing     Problem: Pain  Goal: Verbalizes/displays adequate comfort level or baseline comfort level  Outcome: Progressing

## 2024-07-24 NOTE — RT PROTOCOL NOTE
RT Inhaler-Nebulizer Bronchodilator Protocol Note    There is a bronchodilator order in the chart from a provider indicating to follow the RT Bronchodilator Protocol and there is an “Initiate RT Inhaler-Nebulizer Bronchodilator Protocol” order as well (see protocol at bottom of note).    CXR Findings:  XR CHEST PORTABLE    Result Date: 7/22/2024  No acute cardiopulmonary abnormality. Electronically signed by Marcus Goodman      The findings from the last RT Protocol Assessment were as follows:   History Pulmonary Disease: Smoker 15 pack years or more  Respiratory Pattern: Regular pattern and RR 12-20 bpm  Breath Sounds: Slightly diminished and/or crackles  Cough: Strong, spontaneous, non-productive  Indication for Bronchodilator Therapy:    Bronchodilator Assessment Score: 3    Aerosolized bronchodilator medication orders have been revised according to the RT Inhaler-Nebulizer Bronchodilator Protocol below.    Respiratory Therapist to perform RT Therapy Protocol Assessment initially then follow the protocol.  Repeat RT Therapy Protocol Assessment PRN for score 0-3 or on second treatment, BID, and PRN for scores above 3.    No Indications - adjust the frequency to every 6 hours PRN wheezing or bronchospasm, if no treatments needed after 48 hours then discontinue using Per Protocol order mode.     If indication present, adjust the RT bronchodilator orders based on the Bronchodilator Assessment Score as indicated below.  Use Inhaler orders unless patient has one or more of the following: on home nebulizer, not able to hold breath for 10 seconds, is not alert and oriented, cannot activate and use MDI correctly, or respiratory rate 25 breaths per minute or more, then use the equivalent nebulizer order(s) with same Frequency and PRN reasons based on the score.  If a patient is on this medication at home then do not decrease Frequency below that used at home.    0-3 - enter or revise RT bronchodilator order(s) to equivalent RT

## 2024-07-24 NOTE — PROGRESS NOTES
Infectious Disease Progress Note  2024   Patient Name: Lela Lima : 1945   Impression  GNR in Blood Cultures:  Suspected Polymicrobial Complicated UTI:  Suspicion 2/4 GNR in blood cultures could be bacteremia secondary to a complicated UTI in the setting of an ileal conduit, as pt had an elevated Pct with fevers during her ED visit on 2024.   No reported allergies to ABX. CrCl 32. Afebrile, no leukocytosis. Pct 0.099. Last Pct on 2024 was 0.219.   Past: 2024 BC 2/4 GNR ([facility] microlab reports no growth 2024)   -BC Pending  Past Urine Culture: 2020: Coliform, pan sensitive Pseudomonas aeruginosa. 3/14/2018-MSSA.   -Past: Urine culture: <50,000 CFU/ml mixed pathogens.   -UA WBC 8, RBC 6, urine culture: Pseudomonas aeruginosa 50,000 CFU/ml (pan sensi) and Enterobacter cloacae 10,000 CFU/ml (sensi ciprofloxacin, levofloxacin, ertapenem, gentamicin, meropenem and Bactrim)  -PCXR: no acute CP abnormality.   Ileal Conduit S/p Cystectomy Secondary to Bladder Cancer 2018:  Tobacco Abuse:   Multi-morbidity: per PMHx: anxiety, chronic back pain mgt with gabapentin and bladder cancer s/p bladder removal with ileal conduit placement   Plan:  DC IV Zosyn   Start po ciprofloxacin 500 mg bid x cumulative 7 days (on CrCl 32) (end date 2024)  Start po lactobacillus 1 daily x 30 days (end date 2024) for Cdif prevention   Microlab, BC from  are now showing NGTD as previously showing 2/4 GNR, will continue to treat with above therapy  Follow up with urology  OK from ID standpoint to ME when medically ready    Ongoing Antimicrobial Therapy  Ciprofloxacin -?  Completed Antimicrobial Therapy  Zosyn -?  History:?Interval history noted. Chief complaint: GNR bacteremia. Suspected complicated UTI.  Denies n/v/d/f or untoward effects of antibiotics  Physical Exam:  Vital Signs: BP (!) 105/56   Pulse 76   Temp 98.1 °F (36.7 °C)   Resp 17   Ht 1.499 m  (4' 11\")   Wt 54.8 kg (120 lb 13 oz)   SpO2 95%   BMI 24.40 kg/m²     Gen: remains A&O x 4  Wounds: C/D/I-Ileal conduit intact RLQ draining clear yellow urine.  HEMT: AT/NC Oropharynx pink, moist, and without lesions or exudates; dentition in good state of repair  Eyes: PERRLA, EOMI, conjunctiva pink, sclera anicteric.   Neck: Supple. Trachea midline. No LAD.  Chest: no distress and CTA. Good air movement. Room air.   Heart: NSR and no MRG.   Abd: soft, non-distended,improved RLQ tenderness, no hepatomegaly. Normoactive bowel sounds.  Ext: no clubbing, cyanosis, or edema  Ileal Conduit Catheter Site: without erythema or tenderness  Neuro: Mental status intact. CN 2-12 intact and no focal sensory or motor deficits     Radiologic / Imaging / TESTING  7/18/2024 XR Chest Portable:  IMPRESSION:No acute findings in the chest     7/22/2024 XR Chest Portable:  IMPRESSION:No acute cardiopulmonary abnormality.     Labs:    No results found for this or any previous visit (from the past 24 hour(s)).  CULTURE results: Invalid input(s): \"BLOOD CULTURE\", \"URINE CULTURE\", \"SURGICAL CULTURE\"    Diagnosis:  Patient Active Problem List   Diagnosis    Pneumonia    Snoring    Hypersomnolence    Complex sleep apnea syndrome    Hx of colonic polyps    Altered bowel habits    Drug induced constipation    GERD (gastroesophageal reflux disease)    Esophageal dysphagia    Nausea       Active Problems  Principal Problem (Resolved):    Bacteremia  Active Problems:    * No active hospital problems. *  Resolved Problems:    Stenosis of ileal conduit stoma    Bacteremia due to Gram-negative bacteria    Electronically signed by: Electronically signed by ROSLYN Yadav CNP on 7/24/2024 at 11:15 AM

## 2024-07-24 NOTE — DISCHARGE SUMMARY
Discharge Summary    Name:  Lela Lima /Age/Sex: 1945  (78 y.o. female)   MRN & CSN:  [id number] & [id number] Admission Date/Time: 2024 12:23 PM   Attending:  Andrea Mckeon MD Discharging Physician: Andrea Mckeon MD     Discharge diagnosis and plan:    GNR bacteremia, ruled out  Pseudomonas aeruginosa and Enterobacter cloacae UTI  Patient presented to ED on .  Discharged with p.o. antibiotics for suspected UTI. Blood cultures showed GNR in 2/4 bottles on gram stain and patient was called back for IV antibiotics. Patient was started on IV zosyn. Repeat blood culture resulted no growth over 24 hrs. Urine culture grew pseudomonas and enterobacter cloacae. Blood culture from 2024 resulted on 2024 with reports of no growth and negative on film array as well. ID team were onboard. Patient to be discharged on oral ciprofloxacin for 7 more days.      Chronic comorbidities  Bladder cancer s/p ileal conduit -patient does not have any follow-up as her primary surgeon is no longer in practice.  Will advise patient to follow-up outpatient with a different specialist for follow-up  Chronic back pain, arthralgias-resume home gabapentin, pain medications as needed      Discharge Exam  BP (!) 105/56   Pulse 76   Temp 98.1 °F (36.7 °C)   Resp 17   Ht 1.499 m (4' 11\")   Wt 54.8 kg (120 lb 13 oz)   SpO2 95%   BMI 24.40 kg/m²     Physical Exam  Constitutional:       General: She is not in acute distress.     Appearance: She is well-developed.   HENT:      Head: Normocephalic and atraumatic.      Right Ear: External ear normal.      Left Ear: External ear normal.      Nose: Nose normal.   Eyes:      General: No scleral icterus.        Right eye: No discharge.         Left eye: No discharge.      Conjunctiva/sclera: Conjunctivae normal.      Pupils: Pupils are equal, round, and reactive to light.   Neck:      Thyroid: No thyromegaly.      Vascular: No JVD.      Trachea: No tracheal deviation.    Cardiovascular:      Rate and Rhythm: Normal rate and regular rhythm.      Heart sounds: Normal heart sounds. No murmur heard.     No friction rub. No gallop.   Pulmonary:      Effort: Pulmonary effort is normal. No respiratory distress.      Breath sounds: Normal breath sounds. No stridor. No wheezing or rales.   Chest:      Chest wall: No tenderness.   Abdominal:      General: Bowel sounds are normal. There is no distension.      Palpations: Abdomen is soft. There is no mass.      Tenderness: There is no abdominal tenderness. There is no guarding or rebound.      Hernia: No hernia is present.   Genitourinary:     Vagina: Normal.   Musculoskeletal:         General: No tenderness or deformity. Normal range of motion.      Cervical back: Normal range of motion and neck supple.   Lymphadenopathy:      Cervical: No cervical adenopathy.   Skin:     General: Skin is warm and dry.      Capillary Refill: Capillary refill takes less than 2 seconds.      Coloration: Skin is not pale.      Findings: No erythema or rash.   Neurological:      Mental Status: She is alert and oriented to person, place, and time.      Cranial Nerves: No cranial nerve deficit.      Motor: No abnormal muscle tone.      Coordination: Coordination normal.      Deep Tendon Reflexes: Reflexes normal.   Psychiatric:         Behavior: Behavior normal.         Thought Content: Thought content normal.         Judgment: Judgment normal.           Hospital Course:   Lela Lima is a 78 y.o.  female  who presents with Bacteremia    -Please refer to discharge diagnosis and plan as mentioned above for details on hospital course.    The patient expressed appropriate understanding of and agreement with the discharge recommendations, medications, and plan.     Consults this admission:  IP CONSULT TO INFECTIOUS DISEASES      Discharge Instruction:   Handoff to PCP:   -  Follow up appointments: PCP  Primary care physician: -    Diet:  regular diet   Activity:

## 2024-07-27 LAB
CULTURE: NORMAL
CULTURE: NORMAL
Lab: NORMAL
Lab: NORMAL
SPECIMEN: NORMAL
SPECIMEN: NORMAL

## 2024-07-29 LAB
CULTURE: NORMAL
Lab: NORMAL
Lab: NORMAL
SPECIMEN: NORMAL
SPECIMEN: NORMAL

## 2024-07-30 ENCOUNTER — TELEPHONE (OUTPATIENT)
Dept: GASTROENTEROLOGY | Age: 79
End: 2024-07-30

## 2024-08-07 NOTE — PROGRESS NOTES
Physician Progress Note      PATIENT:               RISHABH SAMANIEGO  CSN #:                  [id number]  :                       1945  ADMIT DATE:       2024 12:23 PM  DISCH DATE:        2024 1:52 PM  RESPONDING  PROVIDER #:        Andrea Mckeon MD          QUERY TEXT:    Pt admitted with UTI.  Pt noted to have ileal conduit . If possible, please   document in the progress notes and discharge summary if you are evaluating   and/or treating any of the following:    The medical record reflects the following:  Risk Factors: uti  Clinical Indicators: DS -Chronic comorbiditiesBladder cancer s/p ileal   conduit -patient does not have any follow-up as her primary surgeon is no   longer in practice.  Pseudomonas aeruginosa and Enterobacter cloacae UTIPatient presented to ED on   .  Discharged with p.o. antibiotics for suspected UTI  Examination CN -Abd: soft, non-distended, mild RLQ tenderness, no   hepatomegaly.  Ileal Conduit Catheter Site: without erythema or tenderness  Urine culture-culture PSEUDOMONAS AERUGINOSA 50,000 CFU/ml & ENTEROBACTER   CLOACAE COMPLEX 10,000 CFU/ml  Treatment: IV Zosyn    Thank you  MICHELE Espinoza,CDS  Options provided:  -- UTI due to ileal conduit  -- UTI not due to ileal conduit  -- Other - I will add my own diagnosis  -- Disagree - Not applicable / Not valid  -- Disagree - Clinically unable to determine / Unknown  -- Refer to Clinical Documentation Reviewer    PROVIDER RESPONSE TEXT:    UTI is due to ileal conduit.    Query created by: Alexis Castro on 2024 1:30 AM      Electronically signed by:  Andrea Mckeon MD 2024 3:33 PM

## 2024-08-22 ENCOUNTER — HOSPITAL ENCOUNTER (OUTPATIENT)
Age: 79
Discharge: HOME | End: 2024-08-22
Payer: MEDICARE

## 2024-08-22 ENCOUNTER — HOSPITAL ENCOUNTER (OUTPATIENT)
Dept: GENERAL RADIOLOGY | Age: 79
Discharge: HOME | End: 2024-08-22
Payer: MEDICARE

## 2024-08-22 DIAGNOSIS — M25.552 LEFT HIP PAIN: ICD-10-CM

## 2024-08-22 PROCEDURE — 73502 X-RAY EXAM HIP UNI 2-3 VIEWS: CPT

## 2024-08-26 ENCOUNTER — TELEPHONE (OUTPATIENT)
Dept: GASTROENTEROLOGY | Age: 79
End: 2024-08-26

## 2024-08-26 NOTE — TELEPHONE ENCOUNTER
Patients daughter called to cancel procedure on 8/27 with dr Pearson, she stated that her mother has been eating nuts and is still eating solid food, patient said that she forgot and would like to reschedule her procedure.  Sending to Tonio

## 2024-11-26 ENCOUNTER — HOSPITAL ENCOUNTER (OUTPATIENT)
Dept: GENERAL RADIOLOGY | Age: 79
Discharge: HOME | End: 2024-11-26
Payer: MEDICARE

## 2024-11-26 ENCOUNTER — HOSPITAL ENCOUNTER (OUTPATIENT)
Age: 79
Discharge: HOME | End: 2024-11-26
Payer: MEDICARE

## 2024-11-26 DIAGNOSIS — J44.1 OBSTRUCTIVE CHRONIC BRONCHITIS WITH EXACERBATION (HCC): ICD-10-CM

## 2024-11-26 PROCEDURE — 71046 X-RAY EXAM CHEST 2 VIEWS: CPT

## 2025-04-28 ENCOUNTER — HOSPITAL ENCOUNTER (OUTPATIENT)
Dept: GENERAL RADIOLOGY | Age: 80
Discharge: HOME | End: 2025-04-28

## 2025-04-28 ENCOUNTER — TRANSCRIBE ORDERS (OUTPATIENT)
Dept: GENERAL RADIOLOGY | Age: 80
End: 2025-04-28

## 2025-04-28 DIAGNOSIS — Z96.642 PRESENCE OF LEFT ARTIFICIAL HIP JOINT: Primary | ICD-10-CM

## 2025-04-28 DIAGNOSIS — Z96.642 PRESENCE OF LEFT ARTIFICIAL HIP JOINT: ICD-10-CM

## 2025-04-30 ENCOUNTER — HOSPITAL ENCOUNTER (OUTPATIENT)
Dept: GENERAL RADIOLOGY | Age: 80
Discharge: HOME | End: 2025-04-30
Payer: MEDICARE

## 2025-04-30 ENCOUNTER — TRANSCRIBE ORDERS (OUTPATIENT)
Dept: GENERAL RADIOLOGY | Age: 80
End: 2025-04-30

## 2025-04-30 DIAGNOSIS — Z96.642 PRESENCE OF LEFT ARTIFICIAL HIP JOINT: Primary | ICD-10-CM

## 2025-04-30 DIAGNOSIS — Z96.642 PRESENCE OF LEFT ARTIFICIAL HIP JOINT: ICD-10-CM

## 2025-04-30 PROCEDURE — 73502 X-RAY EXAM HIP UNI 2-3 VIEWS: CPT

## 2025-06-17 ENCOUNTER — HOSPITAL ENCOUNTER (OUTPATIENT)
Dept: GENERAL RADIOLOGY | Age: 80
Discharge: HOME | End: 2025-06-17
Payer: MEDICARE

## 2025-06-17 ENCOUNTER — HOSPITAL ENCOUNTER (OUTPATIENT)
Age: 80
Discharge: HOME | End: 2025-06-17
Payer: MEDICARE

## 2025-06-17 DIAGNOSIS — R05.9 COUGH, UNSPECIFIED TYPE: ICD-10-CM

## 2025-06-17 LAB
ALBUMIN SERPL-MCNC: 4 G/DL (ref 3.4–5)
ALBUMIN/GLOB SERPL: 1.6 {RATIO} (ref 1.1–2.2)
ALP SERPL-CCNC: 106 U/L (ref 40–129)
ALT SERPL-CCNC: 14 U/L (ref 10–40)
ANION GAP SERPL CALCULATED.3IONS-SCNC: 12 MMOL/L (ref 9–17)
AST SERPL-CCNC: 23 U/L (ref 15–37)
BASOPHILS # BLD: 0.04 K/UL
BASOPHILS NFR BLD: 1 % (ref 0–1)
BILIRUB SERPL-MCNC: <0.2 MG/DL (ref 0–1)
BUN SERPL-MCNC: 18 MG/DL (ref 7–20)
CALCIUM SERPL-MCNC: 9.6 MG/DL (ref 8.3–10.6)
CHLORIDE SERPL-SCNC: 101 MMOL/L (ref 99–110)
CO2 SERPL-SCNC: 23 MMOL/L (ref 21–32)
CREAT SERPL-MCNC: 0.8 MG/DL (ref 0.6–1.2)
EOSINOPHIL # BLD: 0.09 K/UL
EOSINOPHILS RELATIVE PERCENT: 1 % (ref 0–3)
ERYTHROCYTE [DISTWIDTH] IN BLOOD BY AUTOMATED COUNT: 12.9 % (ref 11.7–14.9)
FERRITIN SERPL-MCNC: 201 NG/ML (ref 15–150)
GFR, ESTIMATED: 72 ML/MIN/1.73M2
GLUCOSE SERPL-MCNC: 104 MG/DL (ref 74–99)
HCT VFR BLD AUTO: 44.6 % (ref 37–47)
HGB BLD-MCNC: 14.4 G/DL (ref 12.5–16)
IMM GRANULOCYTES # BLD AUTO: 0.02 K/UL
IMM GRANULOCYTES NFR BLD: 0 %
IRON SATN MFR SERPL: 54 % (ref 15–50)
IRON SERPL-MCNC: 125 UG/DL (ref 37–145)
LYMPHOCYTES NFR BLD: 2.31 K/UL
LYMPHOCYTES RELATIVE PERCENT: 27 % (ref 24–44)
MCH RBC QN AUTO: 33.7 PG (ref 27–31)
MCHC RBC AUTO-ENTMCNC: 32.3 G/DL (ref 32–36)
MCV RBC AUTO: 104.4 FL (ref 78–100)
MONOCYTES NFR BLD: 0.61 K/UL
MONOCYTES NFR BLD: 7 % (ref 0–5)
NEUTROPHILS NFR BLD: 65 % (ref 36–66)
NEUTS SEG NFR BLD: 5.57 K/UL
PLATELET # BLD AUTO: 360 K/UL (ref 140–440)
PMV BLD AUTO: 8.7 FL (ref 7.5–11.1)
POTASSIUM SERPL-SCNC: 4.7 MMOL/L (ref 3.5–5.1)
PROT SERPL-MCNC: 6.5 G/DL (ref 6.4–8.2)
RBC # BLD AUTO: 4.27 M/UL (ref 4.2–5.4)
SODIUM SERPL-SCNC: 135 MMOL/L (ref 136–145)
TIBC SERPL-MCNC: 232 UG/DL (ref 260–445)
UNSATURATED IRON BINDING CAPACITY: 107 UG/DL (ref 110–370)
WBC OTHER # BLD: 8.6 K/UL (ref 4–10.5)

## 2025-06-17 PROCEDURE — 82728 ASSAY OF FERRITIN: CPT

## 2025-06-17 PROCEDURE — 83540 ASSAY OF IRON: CPT

## 2025-06-17 PROCEDURE — 85025 COMPLETE CBC W/AUTO DIFF WBC: CPT

## 2025-06-17 PROCEDURE — 71046 X-RAY EXAM CHEST 2 VIEWS: CPT

## 2025-06-17 PROCEDURE — 83550 IRON BINDING TEST: CPT

## 2025-06-17 PROCEDURE — 80053 COMPREHEN METABOLIC PANEL: CPT

## 2025-07-09 ENCOUNTER — TELEPHONE (OUTPATIENT)
Dept: CARDIOLOGY CLINIC | Age: 80
End: 2025-07-09

## 2025-07-09 NOTE — TELEPHONE ENCOUNTER
Left a Vm for pt to call back and schedule a consult with the first available provider.     Referral by Dr. Mathew  DX: Generalized weakness    1st attempt to contact patient 07/9  No access to My Chart.

## 2025-07-11 ENCOUNTER — TELEPHONE (OUTPATIENT)
Dept: CARDIOLOGY CLINIC | Age: 80
End: 2025-07-11

## 2025-07-11 NOTE — TELEPHONE ENCOUNTER
Left a VM for pt to call back and schedule a consult visit with the first available provider.     Referral by Dr. Mathew  DX: Generalized weakness.     2nd attempt at contacting patient; 7/9, 7/11   No Access to My Chart.

## 2025-07-14 ENCOUNTER — TELEPHONE (OUTPATIENT)
Dept: CARDIOLOGY CLINIC | Age: 80
End: 2025-07-14

## 2025-07-14 NOTE — TELEPHONE ENCOUNTER
Left a Vm for pt to call back and schedule a consult visit with the first available provider.     Referral by Dr. Mathew  DX: Generalized weakness      Third attempt at contacting patient; 7/9,7/11 and 7/14.   No access to [employer].     Referral will be closed: unable to contact   Referral letter will be sent.

## 2025-07-28 ENCOUNTER — INITIAL CONSULT (OUTPATIENT)
Dept: CARDIOLOGY CLINIC | Age: 80
End: 2025-07-28
Payer: MEDICARE

## 2025-07-28 VITALS
HEART RATE: 70 BPM | BODY MASS INDEX: 25.8 KG/M2 | HEIGHT: 59 IN | DIASTOLIC BLOOD PRESSURE: 66 MMHG | SYSTOLIC BLOOD PRESSURE: 114 MMHG | WEIGHT: 128 LBS

## 2025-07-28 DIAGNOSIS — I20.0 UNSTABLE ANGINA PECTORIS (HCC): Primary | ICD-10-CM

## 2025-07-28 DIAGNOSIS — I65.23 BILATERAL CAROTID ARTERY STENOSIS: ICD-10-CM

## 2025-07-28 DIAGNOSIS — R07.9 CHEST PAIN, UNSPECIFIED TYPE: ICD-10-CM

## 2025-07-28 DIAGNOSIS — R06.02 SHORTNESS OF BREATH: ICD-10-CM

## 2025-07-28 DIAGNOSIS — F41.9 ANXIETY: ICD-10-CM

## 2025-07-28 PROCEDURE — 99204 OFFICE O/P NEW MOD 45 MIN: CPT | Performed by: INTERNAL MEDICINE

## 2025-07-28 PROCEDURE — G8419 CALC BMI OUT NRM PARAM NOF/U: HCPCS | Performed by: INTERNAL MEDICINE

## 2025-07-28 PROCEDURE — G8428 CUR MEDS NOT DOCUMENT: HCPCS | Performed by: INTERNAL MEDICINE

## 2025-07-28 PROCEDURE — 93000 ELECTROCARDIOGRAM COMPLETE: CPT | Performed by: INTERNAL MEDICINE

## 2025-07-28 PROCEDURE — 1090F PRES/ABSN URINE INCON ASSESS: CPT | Performed by: INTERNAL MEDICINE

## 2025-07-28 NOTE — PROGRESS NOTES
CLINICAL STAFF DOCUMENTATION    Dr. Shannon Lowery     Lela Merinoant  1945  [id number]    Have you had any Chest Pain recently? - Yes  If Yes DO EKG   When did the pain begin? - Weeks   What type of pain is it? - Sharp Pain   Tender to palpate (touch)? No  Does the pain radiate or stay in one place? - stays  How long does the pain last? - 3 seconds    How Severe is the pain? - 2  Is there anything that aggravates or triggers the pain? stress  Do you have any other symptoms at the same time?     DO EKG IF: Patient has a Heart Rate above 100 or below 50 (Ex:A-fib, Aflutter, PAF, SVT, VT, Bradycardia)      CAD (Coronary Artery Disease) patient should have one on file every 6 months     New Consult or New Patient     If patient is following up from a current Stent/PCI     If patient is following up from a current Cardioversion        Have you had any Shortness of Breath - Yes  When did it begin? - Months   If Yes - When on exertion  How many flights of stairs can you go up without having SOB? - 1  Are you on Oxygen during the day or at night? - No  How many liters of Oxygen are you on? -   How many pillows do you sleep with under your head? - 1    Have you had any dizziness - No    Have you had any palpitations recently? - No  Any thyroid issues? - No    Do you have any edema - swelling in No      Is the patient on any of the following medications -   If Yes DO EKG - Needs done every 3 months    If we do not have these labs, you are retrieve these labs for the provider!    Do you need any prescriptions refilled? - No    Do you have a surgery or procedure scheduled in the near future - No    Do use tobacco products? - Yes  Do you drink alcohol? - No  Do you use any illicit drugs? - No  Caffeine? - Yes  How much caffeine? .1  pots       Check medication list thoroughly!!! AND RECONCILE OUTSIDE MEDICATIONS  If dose has changed change the entire order not just the MG  BE SURE TO ASK PATIENT IF THEY NEED MEDICATION

## 2025-07-28 NOTE — PATIENT INSTRUCTIONS
Thank you for allowing us to care for you today!   We want to ensure we can follow your treatment plan and we strive to give you the best outcomes and experience possible.   If you ever have a life threatening emergency and call 911 - for an ambulance (EMS)  REMEMBER  Our providers can only care for you at:   [facility] or [facility]   Even if you have someone take you or you drive yourself we can only care for you in a [facility] facility. Our providers are not setup at the other healthcare locations!    PLEASE CALL OUR OFFICE DURING NORMAL BUSINESS HOURS  Monday through Friday 8 am to 5 pm  AFTER HOURS the physician on-call cannot help with scheduling, rescheduling, procedure instruction questions or any type of medication need or issue.  [facility] P:[phone] - [facility] P:[phone] - [facility] P:[phone]      If you receive a survey:  We would appreciate you taking the time to share your experience concerning your provider visit in the office.    These surveys are confidential!  We are eager to improve and are counting on you to share your feedback so we can ensure you get the best care possible.

## 2025-07-28 NOTE — PROGRESS NOTES
CARDIOLOGY CONSULT NOTE   Reason for consultation:  shortness of breath, dizziness, chest pain    Referring physician:  No admitting provider for patient encounter.     Primary care physician: Kathy Mathew MD      Dear   Thanks for the consult.    History of present illness:Lela is a 79 y.o.year old who  presents with for shortness of breath which is mild, for many years, intermittent, self limiting, not associated with cough or fever, gets worse with activity and better with rest,  She also has Chest pain is at left side, radiated to shoulder, 6/10, pressure like, associated with shortness of breath, sweating, nausea, relieved with rest and NTG, aggravated with exertion  She has lot of stress test due to health of her kids.  Chief Complaint   Patient presents with    New Patient    Chest Pain    Fatigue     Blood pressure, cholesterol, blood glucose and weight are well controlled.    Past medical history:    has a past medical history of Aneurysm, Anxiety, Arthritis, Back pain, Cancer (HCC), Cardiac abnormality, Falls, and Neuropathy.  Past surgical history:   has a past surgical history that includes Bladder removal; sinus surgery; Foot surgery (Left); Hysterectomy; Inner ear surgery (Right, 1983); Hand surgery; Colonoscopy; Endoscopy, colon, diagnostic; and joint replacement (Left).  Social History:   reports that she has been smoking cigarettes. She has never used smokeless tobacco. She reports that she does not drink alcohol and does not use drugs.  Family history:   no family history of CAD, STROKE of DM    Allergies   Allergen Reactions    Iodine Hives     CT Scan IV contrast   racing heart and hives    Wellbutrin [Bupropion] Hallucinations       No current facility-administered medications for this visit.    Current Outpatient Medications   Medication Sig Dispense Refill    Multiple Vitamins-Minerals (CENTRUM ADULT PO) Take by mouth      diclofenac sodium (VOLTAREN) 1 % GEL Apply 2 g topically

## 2025-08-20 ENCOUNTER — TELEPHONE (OUTPATIENT)
Dept: CARDIOLOGY CLINIC | Age: 80
End: 2025-08-20